# Patient Record
Sex: MALE | Race: BLACK OR AFRICAN AMERICAN | Employment: FULL TIME | URBAN - METROPOLITAN AREA
[De-identification: names, ages, dates, MRNs, and addresses within clinical notes are randomized per-mention and may not be internally consistent; named-entity substitution may affect disease eponyms.]

---

## 2017-09-25 ENCOUNTER — APPOINTMENT (OUTPATIENT)
Dept: MRI IMAGING | Age: 42
DRG: 488 | End: 2017-09-25
Attending: PHYSICIAN ASSISTANT
Payer: SELF-PAY

## 2017-09-25 ENCOUNTER — HOSPITAL ENCOUNTER (INPATIENT)
Age: 42
LOS: 4 days | Discharge: HOME HEALTH | DRG: 488 | End: 2017-09-29
Attending: EMERGENCY MEDICINE | Admitting: INTERNAL MEDICINE
Payer: SELF-PAY

## 2017-09-25 ENCOUNTER — APPOINTMENT (OUTPATIENT)
Dept: GENERAL RADIOLOGY | Age: 42
DRG: 488 | End: 2017-09-25
Attending: PHYSICIAN ASSISTANT
Payer: SELF-PAY

## 2017-09-25 DIAGNOSIS — R79.89 POSITIVE D DIMER: ICD-10-CM

## 2017-09-25 DIAGNOSIS — M00.9 PYOGENIC ARTHRITIS OF RIGHT KNEE JOINT, DUE TO UNSPECIFIED ORGANISM (HCC): Primary | ICD-10-CM

## 2017-09-25 DIAGNOSIS — N17.9 AKI (ACUTE KIDNEY INJURY) (HCC): ICD-10-CM

## 2017-09-25 LAB
ANION GAP SERPL CALC-SCNC: 9 MMOL/L (ref 3–18)
BASOPHILS # BLD: 0 K/UL (ref 0–0.1)
BASOPHILS NFR BLD: 0 % (ref 0–2)
BUN SERPL-MCNC: 18 MG/DL (ref 7–18)
BUN/CREAT SERPL: 12 (ref 12–20)
CALCIUM SERPL-MCNC: 8.9 MG/DL (ref 8.5–10.1)
CHLORIDE SERPL-SCNC: 104 MMOL/L (ref 100–108)
CO2 SERPL-SCNC: 27 MMOL/L (ref 21–32)
CREAT SERPL-MCNC: 1.51 MG/DL (ref 0.6–1.3)
D DIMER PPP FEU-MCNC: 0.49 UG/ML(FEU)
DIFFERENTIAL METHOD BLD: ABNORMAL
EOSINOPHIL # BLD: 0.1 K/UL (ref 0–0.4)
EOSINOPHIL NFR BLD: 1 % (ref 0–5)
ERYTHROCYTE [DISTWIDTH] IN BLOOD BY AUTOMATED COUNT: 13.6 % (ref 11.6–14.5)
GLUCOSE SERPL-MCNC: 99 MG/DL (ref 74–99)
HCT VFR BLD AUTO: 36.9 % (ref 36–48)
HGB BLD-MCNC: 12.3 G/DL (ref 13–16)
LACTATE BLD-SCNC: 0.9 MMOL/L (ref 0.4–2)
LYMPHOCYTES # BLD: 2.3 K/UL (ref 0.9–3.6)
LYMPHOCYTES NFR BLD: 18 % (ref 21–52)
MCH RBC QN AUTO: 31.1 PG (ref 24–34)
MCHC RBC AUTO-ENTMCNC: 33.3 G/DL (ref 31–37)
MCV RBC AUTO: 93.2 FL (ref 74–97)
MONOCYTES # BLD: 1.1 K/UL (ref 0.05–1.2)
MONOCYTES NFR BLD: 9 % (ref 3–10)
NEUTS SEG # BLD: 8.7 K/UL (ref 1.8–8)
NEUTS SEG NFR BLD: 72 % (ref 40–73)
PLATELET # BLD AUTO: 314 K/UL (ref 135–420)
PMV BLD AUTO: 10.4 FL (ref 9.2–11.8)
POTASSIUM SERPL-SCNC: 3.7 MMOL/L (ref 3.5–5.5)
RBC # BLD AUTO: 3.96 M/UL (ref 4.7–5.5)
SODIUM SERPL-SCNC: 140 MMOL/L (ref 136–145)
WBC # BLD AUTO: 12.3 K/UL (ref 4.6–13.2)

## 2017-09-25 PROCEDURE — 83605 ASSAY OF LACTIC ACID: CPT

## 2017-09-25 PROCEDURE — 73560 X-RAY EXAM OF KNEE 1 OR 2: CPT

## 2017-09-25 PROCEDURE — 65270000029 HC RM PRIVATE

## 2017-09-25 PROCEDURE — 74011000258 HC RX REV CODE- 258: Performed by: PHYSICIAN ASSISTANT

## 2017-09-25 PROCEDURE — 80048 BASIC METABOLIC PNL TOTAL CA: CPT | Performed by: PHYSICIAN ASSISTANT

## 2017-09-25 PROCEDURE — 85025 COMPLETE CBC W/AUTO DIFF WBC: CPT | Performed by: PHYSICIAN ASSISTANT

## 2017-09-25 PROCEDURE — 85610 PROTHROMBIN TIME: CPT | Performed by: PHYSICIAN ASSISTANT

## 2017-09-25 PROCEDURE — 85730 THROMBOPLASTIN TIME PARTIAL: CPT | Performed by: PHYSICIAN ASSISTANT

## 2017-09-25 PROCEDURE — 99284 EMERGENCY DEPT VISIT MOD MDM: CPT

## 2017-09-25 PROCEDURE — 74011250636 HC RX REV CODE- 250/636: Performed by: PHYSICIAN ASSISTANT

## 2017-09-25 PROCEDURE — 87040 BLOOD CULTURE FOR BACTERIA: CPT | Performed by: PHYSICIAN ASSISTANT

## 2017-09-25 PROCEDURE — 96365 THER/PROPH/DIAG IV INF INIT: CPT

## 2017-09-25 PROCEDURE — 73723 MRI JOINT LWR EXTR W/O&W/DYE: CPT

## 2017-09-25 PROCEDURE — 96375 TX/PRO/DX INJ NEW DRUG ADDON: CPT

## 2017-09-25 PROCEDURE — 85379 FIBRIN DEGRADATION QUANT: CPT | Performed by: PHYSICIAN ASSISTANT

## 2017-09-25 RX ORDER — MORPHINE SULFATE 4 MG/ML
4 INJECTION, SOLUTION INTRAMUSCULAR; INTRAVENOUS
Status: COMPLETED | OUTPATIENT
Start: 2017-09-25 | End: 2017-09-25

## 2017-09-25 RX ADMIN — Medication 4 MG: at 22:18

## 2017-09-25 RX ADMIN — SODIUM CHLORIDE 1000 ML: 900 INJECTION, SOLUTION INTRAVENOUS at 22:19

## 2017-09-25 RX ADMIN — CEFEPIME HYDROCHLORIDE 2 G: 2 INJECTION, POWDER, FOR SOLUTION INTRAVENOUS at 22:18

## 2017-09-25 NOTE — ED PROVIDER NOTES
HPI Comments: 7:43 PM  Moe Sotelo is a 43 y.o. male presents to ED c/o right knee pain onset 1 week ago. Pain is 9/10 in severity, exacerbated by palpation, and is progressively worsening. Pt states that he had a boil on the right knee which he popped with some purulent drainage 1 week ago. He notes that he rubbed it with alcohol, and last night, the right knee and lower right leg began to be stiff and achy which has progressively worsened. Pt has taken Tylenol and Percocet but without improvement. Pt denies recent surgery on right knee, and any other sxs or complaints. The history is provided by the patient. No  was used. No past medical history on file. No past surgical history on file. No family history on file. Social History     Social History    Marital status:      Spouse name: N/A    Number of children: N/A    Years of education: N/A     Occupational History    Not on file. Social History Main Topics    Smoking status: Not on file    Smokeless tobacco: Not on file    Alcohol use Not on file    Drug use: Not on file    Sexual activity: Not on file     Other Topics Concern    Not on file     Social History Narrative         ALLERGIES: Review of patient's allergies indicates no known allergies. Review of Systems   Constitutional: Negative for fever. HENT: Negative for facial swelling. Eyes: Negative for visual disturbance. Respiratory: Negative for shortness of breath. Cardiovascular: Negative for chest pain. Gastrointestinal: Negative for abdominal pain. Genitourinary: Negative for dysuria. Musculoskeletal: Positive for arthralgias (right knee), gait problem, joint swelling and myalgias (right lower leg). Negative for neck pain. Skin: Positive for color change and wound. Negative for rash. Neurological: Negative for dizziness. Psychiatric/Behavioral: Negative for confusion.    All other systems reviewed and are negative. Vitals:    09/25/17 1854 09/25/17 1856   BP:  (!) 187/97   Pulse:  89   Resp: 16 16   Temp:  98.2 °F (36.8 °C)   SpO2:  99%            Physical Exam   Constitutional: He appears well-developed and well-nourished. No distress. HENT:   Head: Normocephalic and atraumatic. Eyes: Conjunctivae are normal.   Neck: Normal range of motion. Neck supple. Cardiovascular: Normal rate. Pulmonary/Chest: Effort normal.   Abdominal: Soft. Musculoskeletal: Normal range of motion. ROM intact but limited due to significant pain. Tenderness is diffuse, but worse to the knee joint rather than surrounding the pustule. Neurological: He is alert. Skin: Skin is warm and dry. He is not diaphoretic. Diffuse erythema to entire right knee extending from distal thigh to proximal tibia and to lateral edges of knee. Small pustule to lateral side of knee with surrounding grey discoloration but no fluctuance or localized swelling. The entire knee and lower leg is edematous. No lymphangitic streaks. Small area of localized induration to left groin without erythema or fluctuance. Psychiatric: He has a normal mood and affect. Nursing note and vitals reviewed. MDM  Number of Diagnoses or Management Options  KARLEY (acute kidney injury) ([facility] [address]): new and requires workup  Positive D dimer: new and requires workup  Pyogenic arthritis of right knee joint, due to unspecified organism [facility]): new and requires workup  Diagnosis management comments: Concern for septic joint vs abscess. Afebrile, vitals stable. 2130: Discussed with attending Dr Gia Smith who has also evaluated the patient. No white count. Consulted Dr. Clint Gaming due to concern for septic joint. MRI pending. Dr. Clint Gaming would like the patient admitted to the hospitalist service and plans to take him to the OR for washout tomorrow. Consulted hospitalist, awaiting call back. Vanc and cefepime ordered. Blood cultures pending.   No signs of lymphangitis. 2312: Discussed with Dr. Barry Nieves she will admit patient. BP is elevated, plan to recheck after morphine. Cr slightly elevate, hydrated in ED. D-dimer ordered due to right lower leg edema and tenderness, d-dimer elevated, unable to give prophylactic lovenox due to surgery tomorrow; discussed with vascular jet    2345: Patient in MRI then going straight to vascular. Has room assignment. Imaging and dopplers pending. Amount and/or Complexity of Data Reviewed  Clinical lab tests: ordered and reviewed  Tests in the radiology section of CPT®: ordered and reviewed  Tests in the medicine section of CPT®: reviewed and ordered  Discuss the patient with other providers: yes    Risk of Complications, Morbidity, and/or Mortality  Presenting problems: high  Diagnostic procedures: high  Management options: high      ED Course       Procedures    Scribe Attestation      Phyllis Mercado acting as a scribe for and in the presence of [employer]. September 25, 2017 at 7:48 PM       Provider Attestation:      I personally performed the services described in the documentation, reviewed the documentation, as recorded by the scribe in my presence, and it accurately and completely records my words and actions. September 25, 2017 at 7:48 PM - [facility]. Diagnosis:   1. Pyogenic arthritis of right knee joint, due to unspecified organism ([facility] [address]    2. Positive D dimer    3. KARLEY (acute kidney injury) ([facility] [address]          Disposition: home    Follow-up Information     None          Patient's Medications   Start Taking    No medications on file   Continue Taking    NEOMYCIN-POLYMYXIN-HYDROCORTISONE (CORTISPORIN) OTIC SOLUTION    Administer 3-4 Drops in right ear four (4) times daily. For 10 days    OXYCODONE-ACETAMINOPHEN (PERCOCET) 5-325 MG PER TABLET    Take 1 Tab by mouth every eight (8) hours as needed for Pain.    These Medications have changed    No medications on file   Stop Taking    No medications on file     Mamadou MICHAEL Garcia PA-C

## 2017-09-25 NOTE — IP AVS SNAPSHOT
[address] Patient: Wander Rojas MRN: [id number] [id number] You are allergic to the following No active allergies Recent Documentation Height Weight BMI  
  
  
 1.651 m 77.1 kg 28.29 kg/m2 Unresulted Labs Order Current Status CULTURE, ANAEROBIC Preliminary result CULTURE, BLOOD Preliminary result CULTURE, BLOOD Preliminary result CULTURE, WOUND W GRAM STAIN Preliminary result Emergency Contacts Name Discharge Info Relation Home Work Mobile Pj Read DECLINED CAREGIVER [4] Other Relative [6] [phone] About your hospitalization You were admitted on:  September 25, 2017 You last received care in the:  [facility] [address] 1980 You were discharged on:  September 29, 2017 Unit phone number:  [phone] Why you were hospitalized Your primary diagnosis was:  Septic Arthritis Of Knee, Right (Hcc) Your diagnoses also included:  Htn (Hypertension), Arf (Acute Renal Failure) (Hcc), Cellulitis Of Right Knee Providers Seen During Your Hospitalizations Provider Role Specialty Primary office phone Yelena Antonio,  Attending Provider Emergency Medicine [phone] Juan Diallo MD Attending Provider Emergency Medicine [phone] Krista Jaeger MD Attending Provider Internal Medicine [phone] Crispin Paiz MD Attending Provider Internal Medicine [phone] Your Primary Care Physician (PCP) Primary Care Physician Office Phone Office Fax NONE ** None ** ** None ** Follow-up Information Follow up With Details Comments Contact Info Lucila Birmingham MD On 10/13/2017 Appointment at 3:00pm [address] [place] [id number] 
[phone]  Sara Pike MD On 10/3/2017 Appointment at 4:30pm with SHANTI Forte [id number] [place] [address] 
 [facility] [place] [address] 
[phone] Your Appointments Tuesday October 03, 2017  4:30 PM EDT  
POST OP with Neeraj Chand PA-C  
[facility] ([address]) [address] [facility], [address] [place] [address]  
[phone] Friday October 13, 2017  3:00 PM EDT HOSPITAL FOLLOW-UP with Vinayak Dixon MD  
[facility] Roderick Valenzuela [facility] [address] [phone] Current Discharge Medication List  
  
START taking these medications Dose & Instructions Dispensing Information Comments Morning Noon Evening Bedtime  
 amLODIPine 10 mg tablet Commonly known as:  Izetta Pantera Your last dose was: Your next dose is:    
   
   
 Dose:  10 mg Take 1 Tab by mouth daily. Quantity:  30 Tab Refills:  0  
     
   
   
   
  
 cloNIDine HCl 0.1 mg tablet Commonly known as:  CATAPRES Your last dose was: Your next dose is:    
   
   
 Dose:  0.1 mg Take 1 Tab by mouth two (2) times a day. Quantity:  60 Tab Refills:  0  
     
   
   
   
  
 docusate sodium 100 mg capsule Commonly known as:  Darshan Hernandez Your last dose was: Your next dose is:    
   
   
 Dose:  100 mg Take 1 Cap by mouth two (2) times daily as needed for Constipation for up to 90 days. Quantity:  60 Cap Refills:  0  
     
   
   
   
  
 linezolid 600 mg tablet Commonly known as:  Geroge Delatorre Your last dose was: Your next dose is:    
   
   
 Dose:  600 mg Take 1 Tab by mouth every twelve (12) hours for 40 days. Indications: STAPHYLOCOCCUS AUREUS SKIN AND SKIN STRUCTURE INFECTION Quantity:  80 Tab Refills:  0 CONTINUE these medications which have CHANGED Dose & Instructions Dispensing Information Comments Morning Noon Evening Bedtime oxyCODONE-acetaminophen 5-325 mg per tablet Commonly known as:  PERCOCET What changed:   
- when to take this 
- reasons to take this Your last dose was: Your next dose is:    
   
   
 Dose:  1 Tab Take 1 Tab by mouth every four (4) hours as needed. Max Daily Amount: 6 Tabs. Quantity:  20 Tab Refills:  0 CONTINUE these medications which have NOT CHANGED Dose & Instructions Dispensing Information Comments Morning Noon Evening Bedtime  
 neomycin-polymyxin-hydrocortisone otic solution Commonly known as:  CORTISPORIN Your last dose was: Your next dose is:    
   
   
 Dose:  3-4 Drop Administer 3-4 Drops in right ear four (4) times daily. For 10 days Quantity:  10 mL Refills:  0 Where to Get Your Medications Information on where to get these meds will be given to you by the nurse or doctor. ! Ask your nurse or doctor about these medications  
  amLODIPine 10 mg tablet  
 cloNIDine HCl 0.1 mg tablet  
 docusate sodium 100 mg capsule  
 linezolid 600 mg tablet  
 oxyCODONE-acetaminophen 5-325 mg per tablet Discharge Instructions Open Drainage of a Joint: What to Expect at Home Your Recovery You had surgery to clean out an infection in one or more of your joints. The doctor removed fluid and material from the joint. You may feel sore and have some swelling at the surgical site. Your doctor will give you specific instructions on when you can do your normal activities again, such as driving and going back to work. This care sheet gives you a general idea about how long it will take for you to recover. But each person recovers at a different pace. Follow the steps below to get better as quickly as possible. How can you care for yourself at home? Activity · Rest when you feel tired. · You may be able to take showers, if your doctor says it is okay. anytime you sit or lie down during the next 3 days. Try to keep it above the level of your heart. This will help reduce swelling. Other instructions · You may need to use crutches after surgery if the joint is used for walking. Use crutches for as long as your doctor tells you to. Your doctor will tell you when you can put weight on the joint. It may help to use a backpack or wear clothes with a lot of pockets to carry items. · You may wear a sling or brace if you had surgery on the upper part of your body. The doctor will tell you how long you need to wear a support device. Follow-up care is a key part of your treatment and safety. Be sure to make and go to all appointments, and call your doctor if you are having problems. It's also a good idea to know your test results and keep a list of the medicines you take. When should you call for help? Call 911 anytime you think you may need emergency care. For example, call if: 
· You passed out (lost consciousness). · You have severe trouble breathing. · You have sudden chest pain and shortness of breath, or you cough up blood. Call your doctor now or seek immediate medical care if: 
· You have pain that does not get better after you take pain medicine. · You have loose stitches, or your incision comes open. · You have a drain, and it comes out. · You are bleeding through your dressing. A small amount of blood is normal. 
· You have symptoms of a blood clot in your arm or leg (called a deep vein thrombosis). These may include: 
¨ Pain in the arm, calf, back of the knee, thigh, or groin. ¨ Redness and swelling in the arm, leg, or groin. · You have signs of infection, such as: 
¨ Increased pain, swelling, warmth, or redness. ¨ Red streaks leading from the incision. ¨ Pus draining from the incision. ¨ A fever. Watch closely for changes in your health, and be sure to contact your doctor if: 
· You do not get better as expected. Where can you learn more? Go to http://yaya-soto.info/. Enter [id number] in the search box to learn more about \"Open Drainage of a Joint: What to Expect at Home. \" Current as of: March 20, 2017 Content Version: 11.3 © 4646-1102 Skyscanner. Care instructions adapted under license by [employer] (which disclaims liability or warranty for this information). If you have questions about a medical condition or this instruction, always ask your healthcare professional. [place] [address] any warranty or liability for your use of this information. Cellulitis: Care Instructions Your Care Instructions Cellulitis is a skin infection. It often occurs after a break in the skin from a scrape, cut, bite, or puncture, or after a rash. The doctor has checked you carefully, but problems can develop later. If you notice any problems or new symptoms, get medical treatment right away. Follow-up care is a key part of your treatment and safety. Be sure to make and go to all appointments, and call your doctor if you are having problems. It's also a good idea to know your test results and keep a list of the medicines you take. How can you care for yourself at home? · Take your antibiotics as directed. Do not stop taking them just because you feel better. You need to take the full course of antibiotics. · Prop up the infected area on pillows to reduce pain and swelling. Try to keep the area above the level of your heart as often as you can. · If your doctor told you how to care for your wound, follow your doctor's instructions. If you did not get instructions, follow this general advice: ¨ Wash the wound with clean water 2 times a day. Don't use hydrogen peroxide or alcohol, which can slow healing. ¨ You may cover the wound with a thin layer of petroleum jelly, such as Vaseline, and a nonstick bandage. ¨ Apply more petroleum jelly and replace the bandage as needed. · Be safe with medicines. Take pain medicines exactly as directed. ¨ If the doctor gave you a prescription medicine for pain, take it as prescribed. ¨ If you are not taking a prescription pain medicine, ask your doctor if you can take an over-the-counter medicine. To prevent cellulitis in the future · Try to prevent cuts, scrapes, or other injuries to your skin. Cellulitis most often occurs where there is a break in the skin. · If you get a scrape, cut, mild burn, or bite, wash the wound with clean water as soon as you can to help avoid infection. Don't use hydrogen peroxide or alcohol, which can slow healing. · If you have swelling in your legs (edema), support stockings and good skin care may help prevent leg sores and cellulitis. · Take care of your feet, especially if you have diabetes or other conditions that increase the risk of infection. Wear shoes and socks. Do not go barefoot. If you have athlete's foot or other skin problems on your feet, talk to your doctor about how to treat them. When should you call for help? Call your doctor now or seek immediate medical care if: 
· You have signs that your infection is getting worse, such as: 
¨ Increased pain, swelling, warmth, or redness. ¨ Red streaks leading from the area. ¨ Pus draining from the area. ¨ A fever. · You get a rash. Watch closely for changes in your health, and be sure to contact your doctor if: 
· You are not getting better after 1 day (24 hours). · You do not get better as expected. Where can you learn more? Go to http://yaya-soto.info/. Cj De Jesus in the search box to learn more about \"Cellulitis: Care Instructions. \" Current as of: October 13, 2016 Content Version: 11.3 © 9224-3366 Universal Ad. Care instructions adapted under license by [employer] (which disclaims liability or warranty for this information).  If you have questions about a medical condition or this instruction, always ask your healthcare professional. [address] any warranty or liability for your use of this information. High Blood Pressure: Care Instructions Your Care Instructions If your blood pressure is usually above 140/90, you have high blood pressure, or hypertension. That means the top number is 140 or higher or the bottom number is 90 or higher, or both. Despite what a lot of people think, high blood pressure usually doesn't cause headaches or make you feel dizzy or lightheaded. It usually has no symptoms. But it does increase your risk for heart attack, stroke, and kidney or eye damage. The higher your blood pressure, the more your risk increases. Your doctor will give you a goal for your blood pressure. Your goal will be based on your health and your age. An example of a goal is to keep your blood pressure below 140/90. Lifestyle changes, such as eating healthy and being active, are always important to help lower blood pressure. You might also take medicine to reach your blood pressure goal. 
Follow-up care is a key part of your treatment and safety. Be sure to make and go to all appointments, and call your doctor if you are having problems. It's also a good idea to know your test results and keep a list of the medicines you take. How can you care for yourself at home? Medical treatment · If you stop taking your medicine, your blood pressure will go back up. You may take one or more types of medicine to lower your blood pressure. Be safe with medicines. Take your medicine exactly as prescribed. Call your doctor if you think you are having a problem with your medicine. · Talk to your doctor before you start taking aspirin every day. Aspirin can help certain people lower their risk of a heart attack or stroke. But taking aspirin isn't right for everyone, because it can cause serious bleeding. · See your doctor regularly. You may need to see the doctor more often at first or until your blood pressure comes down. · If you are taking blood pressure medicine, talk to your doctor before you take decongestants or anti-inflammatory medicine, such as ibuprofen. Some of these medicines can raise blood pressure. · Learn how to check your blood pressure at home. Lifestyle changes · Stay at a healthy weight. This is especially important if you put on weight around the waist. Losing even 10 pounds can help you lower your blood pressure. · If your doctor recommends it, get more exercise. Walking is a good choice. Bit by bit, increase the amount you walk every day. Try for at least 30 minutes on most days of the week. You also may want to swim, bike, or do other activities. · Avoid or limit alcohol. Talk to your doctor about whether you can drink any alcohol. · Try to limit how much sodium you eat to less than 2,300 milligrams (mg) a day. Your doctor may ask you to try to eat less than 1,500 mg a day. · Eat plenty of fruits (such as bananas and oranges), vegetables, legumes, whole grains, and low-fat dairy products. · Lower the amount of saturated fat in your diet. Saturated fat is found in animal products such as milk, cheese, and meat. Limiting these foods may help you lose weight and also lower your risk for heart disease. · Do not smoke. Smoking increases your risk for heart attack and stroke. If you need help quitting, talk to your doctor about stop-smoking programs and medicines. These can increase your chances of quitting for good. When should you call for help? Call 911 anytime you think you may need emergency care. This may mean having symptoms that suggest that your blood pressure is causing a serious heart or blood vessel problem. Your blood pressure may be over 180/110. For example, call 911 if: 
· You have symptoms of a heart attack. These may include: ¨ Chest pain or pressure, or a strange feeling in the chest. 
¨ Sweating. ¨ Shortness of breath. ¨ Nausea or vomiting. ¨ Pain, pressure, or a strange feeling in the back, neck, jaw, or upper belly or in one or both shoulders or arms. ¨ Lightheadedness or sudden weakness. ¨ A fast or irregular heartbeat. · You have symptoms of a stroke. These may include: 
¨ Sudden numbness, tingling, weakness, or loss of movement in your face, arm, or leg, especially on only one side of your body. ¨ Sudden vision changes. ¨ Sudden trouble speaking. ¨ Sudden confusion or trouble understanding simple statements. ¨ Sudden problems with walking or balance. ¨ A sudden, severe headache that is different from past headaches. · You have severe back or belly pain. Do not wait until your blood pressure comes down on its own. Get help right away. Call your doctor now or seek immediate care if: 
· Your blood pressure is much higher than normal (such as 180/110 or higher), but you don't have symptoms. · You think high blood pressure is causing symptoms, such as: ¨ Severe headache. ¨ Blurry vision. Watch closely for changes in your health, and be sure to contact your doctor if: 
· Your blood pressure measures 140/90 or higher at least 2 times. That means the top number is 140 or higher or the bottom number is 90 or higher, or both. · You think you may be having side effects from your blood pressure medicine. · Your blood pressure is usually normal, but it goes above normal at least 2 times. Where can you learn more? Go to http://yaya-soto.info/. Enter H988 in the search box to learn more about \"High Blood Pressure: Care Instructions. \" Current as of: August 8, 2016 Content Version: 11.3 © 4160-0583 Qool. Care instructions adapted under license by [employer] (which disclaims liability or warranty for this information).  If you have questions about a medical condition or this instruction, always ask your healthcare professional. [place] [address] any warranty or liability for your use of this information. Acute Kidney Injury: Care Instructions Your Care Instructions Acute kidney injury is the sudden loss of kidney function that happens when the kidneys stop working over a period of hours, days or, in some cases, weeks. It is also known as acute renal failure. Common causes of acute kidney injury are dehydration, blood loss, and medicines. When acute kidney injury happens, the kidneys cannot remove waste and excess fluids from the body. The waste and fluids build up and become harmful. Kidney function may return to normal if the cause of acute kidney injury is treated quickly. Your chance of a full recovery depends on what caused the problem, how quickly the cause was treated, and what other medical problems you have. A machine may be used to help your kidneys remove waste and fluids for a short period of time. This is called dialysis. Follow-up care is a key part of your treatment and safety. Be sure to make and go to all appointments, and call your doctor if you are having problems. It's also a good idea to know your test results and keep a list of the medicines you take. How can you care for yourself at home? · Talk to your doctor about how much fluid you should drink. · Eat a balanced diet. Talk to your doctor or a dietitian about what type of diet may be best for you. · Follow the instructions and schedule for dialysis that your doctor gives you. · Do not smoke. Smoking can make your condition worse. If you need help quitting, talk to your doctor about stop-smoking programs and medicines. These can increase your chances of quitting for good. · Do not drink alcohol. · Review all of your medicines with your doctor.  Do not take any medicines, including nonsteroidal anti-inflammatory drugs (NSAIDs) such as ibuprofen (Advil, Motrin) or naproxen (Aleve), unless your doctor says it is safe for you to do so. · Make sure that anyone treating you for any health problem knows that you have had acute kidney injury. When should you call for help? Call 911 anytime you think you may need emergency care. For example, call if: 
· You passed out (lost consciousness). · You have severe trouble breathing. Call your doctor now or seek immediate medical care if: 
· You have less urine than normal or no urine. · You have trouble urinating or can urinate only very small amounts. · You are confused or have trouble thinking clearly. · You feel weaker or more tired than usual. 
· You are very thirsty, lightheaded, or dizzy. · You have nausea and vomiting. · You have new swelling of your arms or feet, or your swelling is worse. · You have blood in your urine. · Your body weight goes up every day. · You have new or worse trouble breathing. Watch closely for changes in your health, and be sure to contact your doctor if: 
· You do not get better as expected. Where can you learn more? Go to http://yaya-soto.info/. Enter R225 in the search box to learn more about \"Acute Kidney Injury: Care Instructions. \" Current as of: April 3, 2017 Content Version: 11.3 © 7482-9612 CoverMe. Care instructions adapted under license by [employer] (which disclaims liability or warranty for this information). If you have questions about a medical condition or this instruction, always ask your healthcare professional. [place] [address] any warranty or liability for your use of this information. Septic Arthritis: Care Instructions Your Care Instructions Septic arthritis is a bacterial infection in a joint. This occurs when an infection from another part of the body, such as pneumonia or a skin or kidney infection, travels through the bloodstream to the joint.  It may also spread to the joint from an infection in nearby soft tissue, or it can follow a surgery or injury. The joint is often warm, swollen, and tender. Early treatment can prevent permanent damage to the joint. Treatment includes antibiotics and draining the joint to remove the infection. Depending on which part of your body is infected, your doctor may drain the joint with a needle or you may need surgery to drain the joint. Follow-up care is a key part of your treatment and safety. Be sure to make and go to all appointments, and call your doctor if you are having problems. It's also a good idea to know your test results and keep a list of the medicines you take. How can you care for yourself at home? · You will receive antibiotics through a vein (IV) at first. After this, you may take antibiotics by mouth. · Take your antibiotics as directed. Do not stop taking them just because you feel better. You need to take the full course of antibiotics. · Rest the joint as much as you can. · If possible, prop up the injured joint on pillows as much as possible for the next 3 days. Try to keep it at or above the level of your heart. This can help reduce pain and swelling. · Follow your doctor's instructions on exercises for the affected joint. · Do not smoke. Smoking can make it harder for your body to fight the infection. If you need help quitting, talk to your doctor about stop-smoking programs and medicines. These can increase your chances of quitting for good. When should you call for help? Call your doctor now or seek immediate medical care if: · Signs of infection return or get worse. These include: 
¨ Increased pain, swelling, warmth, or redness. ¨ Red streaks leading from the joint. ¨ Pus draining from the site. ¨ A fever. ¨ Shaking chills. · You have trouble walking or using the joint. Watch closely for changes in your health, and be sure to contact your doctor if: · After you finish treatment, you cannot move a joint as well as you could before the infection. · The affected limb seems shorter than it was before the infection. · You do not get better as expected. Where can you learn more? Go to http://yaya-soto.info/. Enter C265 in the search box to learn more about \"Septic Arthritis: Care Instructions. \" Current as of: March 3, 2017 Content Version: 11.3 © 2550-7354 Niche. Care instructions adapted under license by [employer]ep (which disclaims liability or warranty for this information). If you have questions about a medical condition or this instruction, always ask your healthcare professional. [address] any warranty or liability for your use of this information. Patient armband removed and shredded MyChart Activation Thank you for requesting access to [employer]. Please follow the instructions below to securely access and download your online medical record. [employer] allows you to send messages to your doctor, view your test results, renew your prescriptions, schedule appointments, and more. How Do I Sign Up? 1. In your internet browser, go to [email or web address] 
2. Click on the First Time User? Click Here link in the Sign In box. You will be redirect to the New Member Sign Up page. 3. Enter your [employer] Access Code exactly as it appears below. You will not need to use this code after youve completed the sign-up process. If you do not sign up before the expiration date, you must request a new code. [employer] Access Code: [email or web address] Expires: 2017  1:44 PM (This is the date your [employer] access code will ) 4. Enter the last four digits of your Social Security Number (xxxx) and Date of Birth (mm/dd/yyyy) as indicated and click Submit. You will be taken to the next sign-up page. 5. Create a [employer] ID.  This will be your [employer] login ID and cannot be changed, so think of one that is secure and easy to remember. 6. Create a [employer] password. You can change your password at any time. 7. Enter your Password Reset Question and Answer. This can be used at a later time if you forget your password. 8. Enter your e-mail address. You will receive e-mail notification when new information is available in [address] 9. Click Sign Up. You can now view and download portions of your medical record. 10. Click the Download Summary menu link to download a portable copy of your medical information. Additional Information If you have questions, please visit the Frequently Asked Questions section of the [employer] website at [email or web address] Remember, [employer] is NOT to be used for urgent needs. For medical emergencies, dial 911. DISCHARGE SUMMARY from Nurse The following personal items are in your possession at time of discharge: 
 
Dental Appliances: None Visual Aid: None PATIENT INSTRUCTIONS: 
 
 
F-face looks uneven A-arms unable to move or move unevenly S-speech slurred or non-existent T-time-call 911 as soon as signs and symptoms begin-DO NOT go Back to bed or wait to see if you get better-TIME IS BRAIN. Warning Signs of HEART ATTACK Call 911 if you have these symptoms: 
? Chest discomfort. Most heart attacks involve discomfort in the center of the chest that lasts more than a few minutes, or that goes away and comes back. It can feel like uncomfortable pressure, squeezing, fullness, or pain. ? Discomfort in other areas of the upper body. Symptoms can include pain or discomfort in one or both arms, the back, neck, jaw, or stomach. ? Shortness of breath with or without chest discomfort. ? Other signs may include breaking out in a cold sweat, nausea, or lightheadedness. Don't wait more than five minutes to call [address]! Fast action can save your life. Calling 911 is almost always the fastest way to get lifesaving treatment. Emergency Medical Services staff can begin treatment when they arrive  up to an hour sooner than if someone gets to the hospital by car. The discharge information has been reviewed with the patient. The patient verbalized understanding. Discharge medications reviewed with the patient and appropriate educational materials and side effects teaching were provided. Patient armband removed and shredded Discharge Instructions Attachments/References AMLODIPINE (BY MOUTH) (ENGLISH) CLONIDINE (BY MOUTH) (ENGLISH) LAXATIVE, STOOL SOFTENERS (BY MOUTH) (ENGLISH) LINEZOLID (BY MOUTH) (ENGLISH) Discharge Orders None [facility] Announcement We are excited to announce that we are making your provider's discharge notes available to you in [employer]. You will see these notes when they are completed and signed by the physician that discharged you from your recent hospital stay. If you have any questions or concerns about any information you see in [employer], please call the Health Information Department where you were seen or reach out to your Primary Care Provider for more information about your plan of care. Introducing [facility]! [employer] introduces [employer] patient portal. Now you can access parts of your medical record, email your doctor's office, and request medication refills online. 1. In your internet browser, go to [email or web address] 2. Click on the First Time User? Click Here link in the Sign In box. You will see the New Member Sign Up page. 3. Enter your [employer] Access Code exactly as it appears below. You will not need to use this code after youve completed the sign-up process. If you do not sign up before the expiration date, you must request a new code. · [employer] Access Code: [email or web address] Expires: 12/27/2017  1:44 PM 
 
4. Enter the last four digits of your Social Security Number (xxxx) and Date of Birth (mm/dd/yyyy) as indicated and click Submit. You will be taken to the next sign-up page. 5. Create a [employer] ID. This will be your [employer] login ID and cannot be changed, so think of one that is secure and easy to remember. 6. Create a [employer] password. You can change your password at any time. 7. Enter your Password Reset Question and Answer. This can be used at a later time if you forget your password. 8. Enter your e-mail address. You will receive e-mail notification when new information is available in [address] 9. Click Sign Up. You can now view and download portions of your medical record. 10. Click the Download Summary menu link to download a portable copy of your medical information. If you have questions, please visit the Frequently Asked Questions section of the [employer] website. Remember, [employer] is NOT to be used for urgent needs. For medical emergencies, dial 911. Now available from your iPhone and Android! General Information Please provide this summary of care documentation to your next provider. Patient Signature:  ____________________________________________________________ Date:  ____________________________________________________________  
  
Jose Davila Provider Signature:  ____________________________________________________________ Date:  ____________________________________________________________ More Information Amlodipine (By mouth) Amlodipine (ou-OUH-pt-peen) Treats high blood pressure and angina (chest pain). This medicine is a calcium channel blocker. Brand Name(s): Norvasc There may be other brand names for this medicine. When This Medicine Should Not Be Used: This medicine is not right for everyone. Do not use it if you had an allergic reaction to amlodipine. How to Use This Medicine:  
Tablet, Dissolving Tablet · Take your medicine as directed. Your dose may need to be changed several times to find what works best for you. Take this medicine at the same time each day. · Read and follow the patient instructions that come with this medicine. Talk to your doctor or pharmacist if you have any questions. · Missed dose: Take a dose as soon as you remember. If it has been more than 12 hours since you were supposed to take your dose, skip the missed dose and take your next regular dose at the regular time. · Store the medicine in a closed container at room temperature, away from heat, moisture, and direct light. Drugs and Foods to Avoid: Ask your doctor or pharmacist before using any other medicine, including over-the-counter medicines, vitamins, and herbal products. · Some medicines can affect how amlodipine works. Tell your doctor if you are also using any of the following: ¨ Clarithromycin, cyclosporine, diltiazem, itraconazole, ritonavir, sildenafil, simvastatin, tacrolimus Warnings While Using This Medicine: · Tell your doctor if you are pregnant or breastfeeding, or if you have liver disease, heart disease, coronary artery disease, or aortic stenosis. · This medicine could lower your blood pressure too much, especially when you first use it or if you are dehydrated. Stand or sit up slowly if you feel lightheaded or dizzy. · Your doctor will check your progress and the effects of this medicine at regular visits. Keep all appointments. · Do not stop using this medicine without asking your doctor, even if you feel well. This medicine will not cure high blood pressure, but it will help keep it in normal range. You may have to take blood pressure medicine for the rest of your life. · Keep all medicine out of the reach of children. Never share your medicine with anyone. Possible Side Effects While Using This Medicine:  
Call your doctor right away if you notice any of these side effects: · Allergic reaction: Itching or hives, swelling in your face or hands, swelling or tingling in your mouth or throat, chest tightness, trouble breathing · Lightheadedness, dizziness · New or worsening chest pain · Swelling in your hands, ankles, or legs · Trouble breathing, nausea, unusual sweating, fainting If you notice other side effects that you think are caused by this medicine, tell your doctor. Call your doctor for medical advice about side effects. You may report side effects to FDA at 1-822-FDA-4282 © 2017 [facility] Information is for End User's use only and may not be sold, redistributed or otherwise used for commercial purposes. The above information is an  only. It is not intended as medical advice for individual conditions or treatments. Talk to your doctor, nurse or pharmacist before following any medical regimen to see if it is safe and effective for you. Clonidine (By mouth) Clonidine (Darion Lema) Treats high blood pressure. Also treats ADHD. Brand Name(s): Madayaprkevin, Tommy Fish There may be other brand names for this medicine. When This Medicine Should Not Be Used: This medicine is not right for everyone. Do not use it if you had an allergic reaction to clonidine. How to Use This Medicine:  
Long Acting Suspension, Tablet, Long Acting Tablet · Take your medicine as directed. Your dose may need to be changed several times to find what works best for you. · Swallow the extended-release tablet whole. Do not crush, break, or chew it. · Clonidine extended-release tablets work differently than clonidine immediate-release tablets. Do not switch from the extended-release tablets to the immediate-release tablets unless your doctor tells you to. · Measure the oral liquid medicine with a marked measuring spoon, oral syringe, or medicine cup. Shake the bottle well before each use. · Read and follow the patient instructions that come with this medicine. Talk to your doctor or pharmacist if you have any questions. · Missed dose: Take a dose as soon as you remember. If it is almost time for your next dose, wait until then and take a regular dose. Do not take extra medicine to make up for a missed dose. If you miss more than 1 dose of clonidine tablets, check with your doctor right away. · Store the medicine in a closed container at room temperature, away from heat, moisture, and direct light. Drugs and Foods to Avoid: Ask your doctor or pharmacist before using any other medicine, including over-the-counter medicines, vitamins, and herbal products. · Do not use this medicine together with other products that contain clonidine. · Some medicines can affect how clonidine works. Tell your doctor if you are taking depression medicine. · Tell your doctor if you use anything else that makes you sleepy. Some examples are allergy medicine, narcotic pain medicine, and alcohol. Warnings While Using This Medicine: · Tell your doctor if you are pregnant, breastfeeding, or have kidney disease, heart or blood vessel disease, heart rhythm problems, stomach or bowel problems, or a history of heart attack or stroke. · This medicine may make you drowsy, dizzy, or lightheaded. Do not drive or do anything that could be dangerous until you know how this medicine affects you. · This medicine may cause dryness of the eyes. Talk to your doctor about how to treat the dryness. · Do not stop using this medicine suddenly. Your doctor will need to slowly decrease your dose before you stop it completely. · Tell any doctor or dentist who treats you that you are using this medicine. You may need to stop using this medicine several days before you have surgery or medical tests. · Even if you feel well, do not stop using the medicine without asking your doctor first. This medicine will not cure your high blood pressure, but it will help keep it in the normal range. You may have to take blood pressure medicine for the rest of your life. · Your doctor will check your progress and the effects of this medicine at regular visits. Keep all appointments. · Keep all medicine out of the reach of children. Never share your medicine with anyone. Possible Side Effects While Using This Medicine:  
Call your doctor right away if you notice any of these side effects: · Allergic reaction: Itching or hives, swelling in your face or hands, swelling or tingling in your mouth or throat, chest tightness, trouble breathing · Fast, slow, pounding, or uneven heartbeat · Lightheadedness, dizziness, fainting · Swelling in your hands, ankles, or feet · Unusual tiredness or weakness If you notice these less serious side effects, talk with your doctor: · Constipation, stomach pain · Dry eyes, mouth, or throat · Mild skin rash · New or worsening headache If you notice other side effects that you think are caused by this medicine, tell your doctor. Call your doctor for medical advice about side effects. You may report side effects to FDA at 0-607-FDA-3225 © 2017 [address] Information is for End User's use only and may not be sold, redistributed or otherwise used for commercial purposes. The above information is an  only. It is not intended as medical advice for individual conditions or treatments.  Talk to your doctor, nurse or pharmacist before following any medical regimen to see if it is safe and effective for you. Laxative, Stool Softeners (By mouth) Treats constipation by helping you have a bowel movement. Brand Name(s): Col-Rite, Colace, Colace Clear, DSS, Diocto, Diocto Liquid, Doc-Q-Lace, Docu Liquid, Docuprene, Docusil, Dok, Dulcolax, Fleet Sof-Lax, [facility] Docusate Calcium, [facility] Stool Softener There may be other brand names for this medicine. When This Medicine Should Not Be Used: You should not use this medicine if you have severe stomach pain, nausea, or vomiting. Stool softeners should not be used if you have severe stomach pain and do not know the cause. How to Use This Medicine:  
Capsule, Tablet, Liquid, Liquid Filled Capsule · Your doctor will tell you how much medicine to use. Do not use more than directed. · Follow the instructions on the medicine label if you are using this medicine without a prescription. · Drink 6 to 8 glasses of water daily while using any laxative. · To make the oral liquid taste better, you may mix it with one-half glass of milk or fruit juice. · Measure the oral liquid medicine with a marked measuring spoon, oral syringe, medicine cup, or medicine dropper. If a dose is missed: · Use the missed dose as soon as possible. · If you do not remember the missed dose until the next day, skip the missed dose and go back to your regular dosing schedule. · You should not use two doses at the same time. How to Store and Dispose of This Medicine: · Store the medicine in a tightly closed container at room temperature, away from heat and moisture. Do not store liquid-filled capsules in the refrigerator. · Keep all medicine out of the reach of children. Drugs and Foods to Avoid: Ask your doctor or pharmacist before using any other medicine, including over-the-counter medicines, vitamins, and herbal products. · You should not use mineral oil while you are using a stool softener. · You should not use a stool softener within 2 hours before or after taking any other medicines. Laxatives can keep other medicines from working correctly. Warnings While Using This Medicine: · If you are pregnant or breastfeeding, talk to your doctor before taking this medicine. · Do not give laxatives to children under 10years old unless you talk to your doctor. · You should not use this laxative for longer than 1 week unless approved by your doctor. Laxatives may be habit-forming and can harm your bowels if you use them too long. · Stool softeners usually work in 1 to 2 days, but for some people, results can take as long as 3 to 5 days. Possible Side Effects While Using This Medicine: If you notice these less serious side effects, talk with your doctor: · Nausea · Sore throat · Skin rash If you notice other side effects that you think are caused by this medicine, tell your doctor. Call your doctor for medical advice about side effects. You may report side effects to FDA at 3-591-FDA-9220 © 2017 [facility] Information is for End User's use only and may not be sold, redistributed or otherwise used for commercial purposes. The above information is an  only. It is not intended as medical advice for individual conditions or treatments. Talk to your doctor, nurse or pharmacist before following any medical regimen to see if it is safe and effective for you. Linezolid (By mouth) Linezolid (mih-UGV-pd-lid) Treats bacterial infections, including skin infections and pneumonia. Brand Name(s): Zyvox There may be other brand names for this medicine. When This Medicine Should Not Be Used: This medicine is not right for everyone. Do not use it if you had an allergic reaction to linezolid. How to Use This Medicine:  
Liquid, Tablet · Your doctor will tell you how much medicine to use. Do not use more than directed. · Oral liquid:  Gently turn the bottle upside down 3 to 5 times before you pour your dose. Do not shake the bottle. Measure the oral liquid medicine with a marked measuring spoon, oral syringe, or medicine cup. · Take all of the medicine in your prescription to clear up your infection, even if you feel better after the first few doses. · Missed dose: Take a dose as soon as you remember. If it is almost time for your next dose, wait until then and take a regular dose. Do not take extra medicine to make up for a missed dose. · Store the medicine in a closed container at room temperature, away from heat, moisture, and direct light. You may keep the oral liquid for up to 21 days. After that, throw away any unused medicine. Drugs and Foods to Avoid: Ask your doctor or pharmacist before using any other medicine, including over-the-counter medicines, vitamins, and herbal products. · You must avoid many other medicines while you are using linezolid. These medicines used together could cause serious health problems, including death. Ask your doctor before you use any other medicine. You may need to wait 1 to 5 weeks before you can use the other medicine. · Do not use linezolid if you have used an MAOI in the past 14 days. · Some other medicines that may interact with linezolid include buspirone, bupropion, dobutamine, dopamine, meperidine, cold or allergy medicine (such as phenylpropanolamine, pseudoephedrine), medicine to treat depression (SSRI or TCA), or medicine to treat migraine headaches. This is not a complete list. 
· Avoid foods and drinks that are high in tyramine, because your blood pressure could get dangerously high.  Your doctor should give you a complete list. In general, do not eat anything aged or fermented, such as most cheese, most alcohol, cured meat (such as salami), sauerkraut, and soy sauce. Check the expiration dates on packages. Tyramine levels get higher as food gets older or if it has not been refrigerated properly. Warnings While Using This Medicine: · Tell your doctor if you are pregnant or breastfeeding, or if you have bone marrow problems, carcinoid syndrome, diabetes, pheochromocytoma (a tumor of the adrenal gland), thyroid problems, high blood pressure, or a history of seizures. · This medicine may cause the following problems: 
¨ Lactic acidosis (too much lactic acid in the blood) ¨ Serotonin syndrome ¨ Nerve problems, especially in the eyes, hands, arms, legs, or feet · This medicine may make you bleed, bruise, or get infections more easily. Take precautions to prevent illness and injury. Wash your hands often. · This medicine can cause diarrhea. Call your doctor if the diarrhea becomes severe, does not stop, or is bloody. Do not take any medicine to stop diarrhea until you have talked to your doctor. Diarrhea can occur 2 months or more after you stop taking this medicine. · The oral liquid contains phenylalanine (aspartame). This is only a concern if you have phenylketonuria. · Your doctor will do lab tests at regular visits to check on the effects of this medicine. Keep all appointments. · Call your doctor if your symptoms do not improve or if they get worse. · Keep all medicine out of the reach of children. Never share your medicine with anyone. Possible Side Effects While Using This Medicine:  
Call your doctor right away if you notice any of these side effects: · Allergic reaction: Itching or hives, swelling in your face or hands, swelling or tingling in your mouth or throat, chest tightness, trouble breathing · Anxiety, restlessness, fast heartbeat, fever, sweating, muscle spasms, twitching, nausea, vomiting, diarrhea, seeing or hearing things that are not there · Diarrhea that contains blood · Fast breathing, trouble breathing, nausea and vomiting, lightheadedness, severe weakness, tiredness, or confusion · Fever, chills, cough, sore throat, and body aches · Numbness, tingling, or burning pain in your hands, arms, legs, or feet · Seizures · Unusual bleeding or bruising · Vision changes, trouble seeing If you notice these less serious side effects, talk with your doctor:  
· Headache · Mild nausea, diarrhea, or vomiting If you notice other side effects that you think are caused by this medicine, tell your doctor. Call your doctor for medical advice about side effects. You may report side effects to FDA at 0-293-FDA-5076 © 2017 [address] Information is for End User's use only and may not be sold, redistributed or otherwise used for commercial purposes. The above information is an  only. It is not intended as medical advice for individual conditions or treatments. Talk to your doctor, nurse or pharmacist before following any medical regimen to see if it is safe and effective for you.

## 2017-09-25 NOTE — ED NOTES
Pt arrived c/o right knee pain and left hip pain that started a week ago, pt states he had blisters in those areas and popped them, states he attempted to clean them with rubbing alcohol, also states he was taking tylenol for the pain which did not help, took a friend's 10mg percocet today and that helped but it wore off, states pain now radiates down leg.

## 2017-09-26 ENCOUNTER — ANESTHESIA (OUTPATIENT)
Dept: SURGERY | Age: 42
DRG: 488 | End: 2017-09-26
Payer: SELF-PAY

## 2017-09-26 ENCOUNTER — APPOINTMENT (OUTPATIENT)
Dept: GENERAL RADIOLOGY | Age: 42
DRG: 488 | End: 2017-09-26
Attending: PHYSICIAN ASSISTANT
Payer: SELF-PAY

## 2017-09-26 ENCOUNTER — ANESTHESIA EVENT (OUTPATIENT)
Dept: SURGERY | Age: 42
DRG: 488 | End: 2017-09-26
Payer: SELF-PAY

## 2017-09-26 PROBLEM — L03.115 CELLULITIS OF RIGHT KNEE: Status: ACTIVE | Noted: 2017-09-26

## 2017-09-26 PROBLEM — N17.9 ARF (ACUTE RENAL FAILURE) (HCC): Status: ACTIVE | Noted: 2017-09-26

## 2017-09-26 PROBLEM — I10 HTN (HYPERTENSION): Status: ACTIVE | Noted: 2017-09-26

## 2017-09-26 LAB
ANION GAP SERPL CALC-SCNC: 9 MMOL/L (ref 3–18)
APTT PPP: 37.2 SEC (ref 23–36.4)
ATRIAL RATE: 103 BPM
BODY FLD TYPE: NORMAL
BUN SERPL-MCNC: 13 MG/DL (ref 7–18)
BUN/CREAT SERPL: 13 (ref 12–20)
CALCIUM SERPL-MCNC: 8.3 MG/DL (ref 8.5–10.1)
CALCULATED P AXIS, ECG09: 58 DEGREES
CALCULATED R AXIS, ECG10: 57 DEGREES
CALCULATED T AXIS, ECG11: 58 DEGREES
CHLORIDE SERPL-SCNC: 107 MMOL/L (ref 100–108)
CO2 SERPL-SCNC: 23 MMOL/L (ref 21–32)
CREAT SERPL-MCNC: 1.03 MG/DL (ref 0.6–1.3)
CRYSTALS FLD MICRO: NORMAL
DIAGNOSIS, 93000: NORMAL
ERYTHROCYTE [DISTWIDTH] IN BLOOD BY AUTOMATED COUNT: 13.7 % (ref 11.6–14.5)
GLUCOSE SERPL-MCNC: 100 MG/DL (ref 74–99)
HCT VFR BLD AUTO: 36.2 % (ref 36–48)
HGB BLD-MCNC: 12 G/DL (ref 13–16)
INR PPP: 1 (ref 0.8–1.2)
MCH RBC QN AUTO: 31 PG (ref 24–34)
MCHC RBC AUTO-ENTMCNC: 33.1 G/DL (ref 31–37)
MCV RBC AUTO: 93.5 FL (ref 74–97)
P-R INTERVAL, ECG05: 132 MS
PLATELET # BLD AUTO: 301 K/UL (ref 135–420)
PMV BLD AUTO: 10.5 FL (ref 9.2–11.8)
POTASSIUM SERPL-SCNC: 3.6 MMOL/L (ref 3.5–5.5)
PROTHROMBIN TIME: 12.8 SEC (ref 11.5–15.2)
Q-T INTERVAL, ECG07: 340 MS
QRS DURATION, ECG06: 84 MS
QTC CALCULATION (BEZET), ECG08: 445 MS
RBC # BLD AUTO: 3.87 M/UL (ref 4.7–5.5)
SODIUM SERPL-SCNC: 139 MMOL/L (ref 136–145)
VENTRICULAR RATE, ECG03: 103 BPM
WBC # BLD AUTO: 12.8 K/UL (ref 4.6–13.2)

## 2017-09-26 PROCEDURE — 97116 GAIT TRAINING THERAPY: CPT

## 2017-09-26 PROCEDURE — 77030013480 HC CUF TRNQT J&J -B: Performed by: SPECIALIST

## 2017-09-26 PROCEDURE — 77030020782 HC GWN BAIR PAWS FLX 3M -B: Performed by: SPECIALIST

## 2017-09-26 PROCEDURE — 87070 CULTURE OTHR SPECIMN AEROBIC: CPT | Performed by: INTERNAL MEDICINE

## 2017-09-26 PROCEDURE — 0SBC4ZZ EXCISION OF RIGHT KNEE JOINT, PERCUTANEOUS ENDOSCOPIC APPROACH: ICD-10-PCS | Performed by: SPECIALIST

## 2017-09-26 PROCEDURE — 77030022036 HC BLD SHV TOMCAT STRY -B: Performed by: SPECIALIST

## 2017-09-26 PROCEDURE — 89060 EXAM SYNOVIAL FLUID CRYSTALS: CPT | Performed by: SPECIALIST

## 2017-09-26 PROCEDURE — 76010000149 HC OR TIME 1 TO 1.5 HR: Performed by: SPECIALIST

## 2017-09-26 PROCEDURE — 93971 EXTREMITY STUDY: CPT

## 2017-09-26 PROCEDURE — 87070 CULTURE OTHR SPECIMN AEROBIC: CPT | Performed by: SPECIALIST

## 2017-09-26 PROCEDURE — 77030010509 HC AIRWY LMA MSK TELE -A: Performed by: ANESTHESIOLOGY

## 2017-09-26 PROCEDURE — 74011258636 HC RX REV CODE- 258/636: Performed by: SPECIALIST

## 2017-09-26 PROCEDURE — 36415 COLL VENOUS BLD VENIPUNCTURE: CPT | Performed by: INTERNAL MEDICINE

## 2017-09-26 PROCEDURE — 87075 CULTR BACTERIA EXCEPT BLOOD: CPT | Performed by: INTERNAL MEDICINE

## 2017-09-26 PROCEDURE — 74011000250 HC RX REV CODE- 250

## 2017-09-26 PROCEDURE — 74011250636 HC RX REV CODE- 250/636

## 2017-09-26 PROCEDURE — 77030018836 HC SOL IRR NACL ICUM -A: Performed by: SPECIALIST

## 2017-09-26 PROCEDURE — 76060000033 HC ANESTHESIA 1 TO 1.5 HR: Performed by: SPECIALIST

## 2017-09-26 PROCEDURE — 74011250637 HC RX REV CODE- 250/637: Performed by: INTERNAL MEDICINE

## 2017-09-26 PROCEDURE — 74011250636 HC RX REV CODE- 250/636: Performed by: NURSE ANESTHETIST, CERTIFIED REGISTERED

## 2017-09-26 PROCEDURE — 97161 PT EVAL LOW COMPLEX 20 MIN: CPT

## 2017-09-26 PROCEDURE — 65270000029 HC RM PRIVATE

## 2017-09-26 PROCEDURE — 87186 SC STD MICRODIL/AGAR DIL: CPT | Performed by: INTERNAL MEDICINE

## 2017-09-26 PROCEDURE — 80048 BASIC METABOLIC PNL TOTAL CA: CPT | Performed by: INTERNAL MEDICINE

## 2017-09-26 PROCEDURE — 74011250636 HC RX REV CODE- 250/636: Performed by: SPECIALIST

## 2017-09-26 PROCEDURE — 74011250636 HC RX REV CODE- 250/636: Performed by: PHYSICIAN ASSISTANT

## 2017-09-26 PROCEDURE — 77030027138 HC INCENT SPIROMETER -A

## 2017-09-26 PROCEDURE — 85027 COMPLETE CBC AUTOMATED: CPT | Performed by: INTERNAL MEDICINE

## 2017-09-26 PROCEDURE — A9585 GADOBUTROL INJECTION: HCPCS | Performed by: EMERGENCY MEDICINE

## 2017-09-26 PROCEDURE — 74011000258 HC RX REV CODE- 258: Performed by: PHYSICIAN ASSISTANT

## 2017-09-26 PROCEDURE — 74011250636 HC RX REV CODE- 250/636: Performed by: FAMILY MEDICINE

## 2017-09-26 PROCEDURE — 74011250636 HC RX REV CODE- 250/636: Performed by: INTERNAL MEDICINE

## 2017-09-26 PROCEDURE — 77030008574 HC TBNG SUC IRR STRY -B: Performed by: SPECIALIST

## 2017-09-26 PROCEDURE — 71010 XR CHEST PORT: CPT

## 2017-09-26 PROCEDURE — 77030012891

## 2017-09-26 PROCEDURE — 77030032490 HC SLV COMPR SCD KNE COVD -B: Performed by: SPECIALIST

## 2017-09-26 PROCEDURE — 74011250636 HC RX REV CODE- 250/636: Performed by: EMERGENCY MEDICINE

## 2017-09-26 PROCEDURE — 74011250637 HC RX REV CODE- 250/637: Performed by: SPECIALIST

## 2017-09-26 PROCEDURE — 93005 ELECTROCARDIOGRAM TRACING: CPT

## 2017-09-26 PROCEDURE — 76210000016 HC OR PH I REC 1 TO 1.5 HR: Performed by: SPECIALIST

## 2017-09-26 PROCEDURE — 77030002916 HC SUT ETHLN J&J -A: Performed by: SPECIALIST

## 2017-09-26 RX ORDER — MORPHINE SULFATE 2 MG/ML
2 INJECTION, SOLUTION INTRAMUSCULAR; INTRAVENOUS
Status: DISCONTINUED | OUTPATIENT
Start: 2017-09-26 | End: 2017-09-26

## 2017-09-26 RX ORDER — SODIUM CHLORIDE, SODIUM LACTATE, POTASSIUM CHLORIDE, CALCIUM CHLORIDE 600; 310; 30; 20 MG/100ML; MG/100ML; MG/100ML; MG/100ML
INJECTION, SOLUTION INTRAVENOUS
Status: DISCONTINUED | OUTPATIENT
Start: 2017-09-26 | End: 2017-09-26 | Stop reason: HOSPADM

## 2017-09-26 RX ORDER — MORPHINE SULFATE 4 MG/ML
4 INJECTION, SOLUTION INTRAMUSCULAR; INTRAVENOUS
Status: COMPLETED | OUTPATIENT
Start: 2017-09-26 | End: 2017-09-26

## 2017-09-26 RX ORDER — SODIUM CHLORIDE, SODIUM LACTATE, POTASSIUM CHLORIDE, CALCIUM CHLORIDE 600; 310; 30; 20 MG/100ML; MG/100ML; MG/100ML; MG/100ML
75 INJECTION, SOLUTION INTRAVENOUS CONTINUOUS
Status: DISCONTINUED | OUTPATIENT
Start: 2017-09-26 | End: 2017-09-26 | Stop reason: HOSPADM

## 2017-09-26 RX ORDER — PROPOFOL 10 MG/ML
INJECTION, EMULSION INTRAVENOUS AS NEEDED
Status: DISCONTINUED | OUTPATIENT
Start: 2017-09-26 | End: 2017-09-26 | Stop reason: HOSPADM

## 2017-09-26 RX ORDER — HYDRALAZINE HYDROCHLORIDE 20 MG/ML
10 INJECTION INTRAMUSCULAR; INTRAVENOUS
Status: DISCONTINUED | OUTPATIENT
Start: 2017-09-26 | End: 2017-09-29 | Stop reason: HOSPADM

## 2017-09-26 RX ORDER — MIDAZOLAM HYDROCHLORIDE 1 MG/ML
INJECTION, SOLUTION INTRAMUSCULAR; INTRAVENOUS AS NEEDED
Status: DISCONTINUED | OUTPATIENT
Start: 2017-09-26 | End: 2017-09-26 | Stop reason: HOSPADM

## 2017-09-26 RX ORDER — SODIUM CHLORIDE 0.9 % (FLUSH) 0.9 %
5-10 SYRINGE (ML) INJECTION EVERY 8 HOURS
Status: DISCONTINUED | OUTPATIENT
Start: 2017-09-26 | End: 2017-09-29 | Stop reason: HOSPADM

## 2017-09-26 RX ORDER — LABETALOL HCL 20 MG/4 ML
5 SYRINGE (ML) INTRAVENOUS AS NEEDED
Status: DISCONTINUED | OUTPATIENT
Start: 2017-09-26 | End: 2017-09-26 | Stop reason: HOSPADM

## 2017-09-26 RX ORDER — LABETALOL HCL 20 MG/4 ML
SYRINGE (ML) INTRAVENOUS
Status: COMPLETED
Start: 2017-09-26 | End: 2017-09-26

## 2017-09-26 RX ORDER — ONDANSETRON 2 MG/ML
INJECTION INTRAMUSCULAR; INTRAVENOUS AS NEEDED
Status: DISCONTINUED | OUTPATIENT
Start: 2017-09-26 | End: 2017-09-26 | Stop reason: HOSPADM

## 2017-09-26 RX ORDER — ACETAMINOPHEN 325 MG/1
650 TABLET ORAL
Status: DISCONTINUED | OUTPATIENT
Start: 2017-09-26 | End: 2017-09-29 | Stop reason: HOSPADM

## 2017-09-26 RX ORDER — OXYCODONE AND ACETAMINOPHEN 5; 325 MG/1; MG/1
1 TABLET ORAL
Status: DISCONTINUED | OUTPATIENT
Start: 2017-09-26 | End: 2017-09-26 | Stop reason: SDUPTHER

## 2017-09-26 RX ORDER — HYDROMORPHONE HYDROCHLORIDE 2 MG/ML
0.5 INJECTION, SOLUTION INTRAMUSCULAR; INTRAVENOUS; SUBCUTANEOUS AS NEEDED
Status: DISCONTINUED | OUTPATIENT
Start: 2017-09-26 | End: 2017-09-26 | Stop reason: HOSPADM

## 2017-09-26 RX ORDER — HYDROMORPHONE HYDROCHLORIDE 2 MG/ML
1 INJECTION, SOLUTION INTRAMUSCULAR; INTRAVENOUS; SUBCUTANEOUS
Status: DISCONTINUED | OUTPATIENT
Start: 2017-09-26 | End: 2017-09-29 | Stop reason: HOSPADM

## 2017-09-26 RX ORDER — SODIUM CHLORIDE 0.9 % (FLUSH) 0.9 %
5-10 SYRINGE (ML) INJECTION AS NEEDED
Status: DISCONTINUED | OUTPATIENT
Start: 2017-09-26 | End: 2017-09-26 | Stop reason: HOSPADM

## 2017-09-26 RX ORDER — FENTANYL CITRATE 50 UG/ML
INJECTION, SOLUTION INTRAMUSCULAR; INTRAVENOUS AS NEEDED
Status: DISCONTINUED | OUTPATIENT
Start: 2017-09-26 | End: 2017-09-26 | Stop reason: HOSPADM

## 2017-09-26 RX ORDER — OXYCODONE AND ACETAMINOPHEN 5; 325 MG/1; MG/1
1 TABLET ORAL
Status: DISCONTINUED | OUTPATIENT
Start: 2017-09-26 | End: 2017-09-26

## 2017-09-26 RX ORDER — MAGNESIUM SULFATE 100 %
4 CRYSTALS MISCELLANEOUS AS NEEDED
Status: DISCONTINUED | OUTPATIENT
Start: 2017-09-26 | End: 2017-09-26 | Stop reason: HOSPADM

## 2017-09-26 RX ORDER — HYDROMORPHONE HYDROCHLORIDE 1 MG/ML
INJECTION, SOLUTION INTRAMUSCULAR; INTRAVENOUS; SUBCUTANEOUS AS NEEDED
Status: DISCONTINUED | OUTPATIENT
Start: 2017-09-26 | End: 2017-09-26 | Stop reason: HOSPADM

## 2017-09-26 RX ORDER — DEXTROSE, SODIUM CHLORIDE, SODIUM LACTATE, POTASSIUM CHLORIDE, AND CALCIUM CHLORIDE 5; .6; .31; .03; .02 G/100ML; G/100ML; G/100ML; G/100ML; G/100ML
75 INJECTION, SOLUTION INTRAVENOUS CONTINUOUS
Status: DISCONTINUED | OUTPATIENT
Start: 2017-09-26 | End: 2017-09-26

## 2017-09-26 RX ORDER — NEOMYCIN SULFATE, POLYMYXIN B SULFATE, HYDROCORTISONE 3.5; 10000; 1 MG/ML; [USP'U]/ML; MG/ML
3-4 SOLUTION/ DROPS AURICULAR (OTIC) 4 TIMES DAILY
Status: DISCONTINUED | OUTPATIENT
Start: 2017-09-26 | End: 2017-09-29 | Stop reason: HOSPADM

## 2017-09-26 RX ORDER — SODIUM CHLORIDE 0.9 % (FLUSH) 0.9 %
5-10 SYRINGE (ML) INJECTION AS NEEDED
Status: DISCONTINUED | OUTPATIENT
Start: 2017-09-26 | End: 2017-09-29 | Stop reason: HOSPADM

## 2017-09-26 RX ORDER — MORPHINE SULFATE 4 MG/ML
4 INJECTION, SOLUTION INTRAMUSCULAR; INTRAVENOUS
Status: DISCONTINUED | OUTPATIENT
Start: 2017-09-26 | End: 2017-09-26 | Stop reason: SDUPTHER

## 2017-09-26 RX ORDER — MORPHINE SULFATE 4 MG/ML
INJECTION, SOLUTION INTRAMUSCULAR; INTRAVENOUS
Status: COMPLETED
Start: 2017-09-26 | End: 2017-09-26

## 2017-09-26 RX ORDER — ONDANSETRON 2 MG/ML
4 INJECTION INTRAMUSCULAR; INTRAVENOUS
Status: DISCONTINUED | OUTPATIENT
Start: 2017-09-26 | End: 2017-09-29 | Stop reason: HOSPADM

## 2017-09-26 RX ORDER — DEXTROSE 50 % IN WATER (D50W) INTRAVENOUS SYRINGE
25-50 AS NEEDED
Status: DISCONTINUED | OUTPATIENT
Start: 2017-09-26 | End: 2017-09-26 | Stop reason: HOSPADM

## 2017-09-26 RX ORDER — OXYCODONE AND ACETAMINOPHEN 5; 325 MG/1; MG/1
1 TABLET ORAL
Status: DISCONTINUED | OUTPATIENT
Start: 2017-09-26 | End: 2017-09-29 | Stop reason: HOSPADM

## 2017-09-26 RX ORDER — ONDANSETRON 2 MG/ML
4 INJECTION INTRAMUSCULAR; INTRAVENOUS ONCE
Status: COMPLETED | OUTPATIENT
Start: 2017-09-26 | End: 2017-09-26

## 2017-09-26 RX ORDER — CEFAZOLIN SODIUM 2 G/50ML
2 SOLUTION INTRAVENOUS EVERY 8 HOURS
Status: DISCONTINUED | OUTPATIENT
Start: 2017-09-26 | End: 2017-09-26

## 2017-09-26 RX ORDER — LIDOCAINE HYDROCHLORIDE 20 MG/ML
INJECTION, SOLUTION EPIDURAL; INFILTRATION; INTRACAUDAL; PERINEURAL AS NEEDED
Status: DISCONTINUED | OUTPATIENT
Start: 2017-09-26 | End: 2017-09-26 | Stop reason: HOSPADM

## 2017-09-26 RX ORDER — DOCUSATE SODIUM 100 MG/1
100 CAPSULE, LIQUID FILLED ORAL 2 TIMES DAILY
Status: DISCONTINUED | OUTPATIENT
Start: 2017-09-26 | End: 2017-09-29 | Stop reason: HOSPADM

## 2017-09-26 RX ADMIN — Medication 4 MG: at 06:59

## 2017-09-26 RX ADMIN — HYDROMORPHONE HYDROCHLORIDE 1 MG: 1 INJECTION, SOLUTION INTRAMUSCULAR; INTRAVENOUS; SUBCUTANEOUS at 11:25

## 2017-09-26 RX ADMIN — SODIUM CHLORIDE, SODIUM LACTATE, POTASSIUM CHLORIDE, CALCIUM CHLORIDE: 600; 310; 30; 20 INJECTION, SOLUTION INTRAVENOUS at 10:36

## 2017-09-26 RX ADMIN — PROPOFOL 200 MG: 10 INJECTION, EMULSION INTRAVENOUS at 10:44

## 2017-09-26 RX ADMIN — CEFEPIME HYDROCHLORIDE 2 G: 2 INJECTION, POWDER, FOR SOLUTION INTRAVENOUS at 06:13

## 2017-09-26 RX ADMIN — ONDANSETRON 4 MG: 2 INJECTION INTRAMUSCULAR; INTRAVENOUS at 10:57

## 2017-09-26 RX ADMIN — HYDROMORPHONE HYDROCHLORIDE 1 MG: 2 INJECTION, SOLUTION INTRAMUSCULAR; INTRAVENOUS; SUBCUTANEOUS at 22:45

## 2017-09-26 RX ADMIN — HYDRALAZINE HYDROCHLORIDE 10 MG: 20 INJECTION INTRAMUSCULAR; INTRAVENOUS at 02:00

## 2017-09-26 RX ADMIN — SODIUM CHLORIDE, SODIUM LACTATE, POTASSIUM CHLORIDE, CALCIUM CHLORIDE, AND DEXTROSE MONOHYDRATE 75 ML/HR: 600; 310; 30; 20; 5 INJECTION, SOLUTION INTRAVENOUS at 14:46

## 2017-09-26 RX ADMIN — LABETALOL HYDROCHLORIDE 5 MG: 5 INJECTION, SOLUTION INTRAVENOUS at 11:53

## 2017-09-26 RX ADMIN — CEFEPIME HYDROCHLORIDE 2 G: 2 INJECTION, POWDER, FOR SOLUTION INTRAVENOUS at 14:53

## 2017-09-26 RX ADMIN — Medication 10 ML: at 22:46

## 2017-09-26 RX ADMIN — LABETALOL HYDROCHLORIDE 5 MG: 5 INJECTION, SOLUTION INTRAVENOUS at 12:03

## 2017-09-26 RX ADMIN — MIDAZOLAM HYDROCHLORIDE 2 MG: 1 INJECTION, SOLUTION INTRAMUSCULAR; INTRAVENOUS at 10:36

## 2017-09-26 RX ADMIN — Medication 4 MG: at 02:56

## 2017-09-26 RX ADMIN — DOCUSATE SODIUM 100 MG: 100 CAPSULE, LIQUID FILLED ORAL at 17:44

## 2017-09-26 RX ADMIN — MEPERIDINE HYDROCHLORIDE 25 MG: 25 INJECTION INTRAMUSCULAR; INTRAVENOUS; SUBCUTANEOUS at 11:56

## 2017-09-26 RX ADMIN — OXYCODONE HYDROCHLORIDE AND ACETAMINOPHEN 1 TABLET: 5; 325 TABLET ORAL at 03:31

## 2017-09-26 RX ADMIN — SODIUM CHLORIDE 1000 MG: 900 INJECTION, SOLUTION INTRAVENOUS at 22:45

## 2017-09-26 RX ADMIN — FENTANYL CITRATE 50 MCG: 50 INJECTION, SOLUTION INTRAMUSCULAR; INTRAVENOUS at 10:36

## 2017-09-26 RX ADMIN — SODIUM CHLORIDE 1000 MG: 900 INJECTION, SOLUTION INTRAVENOUS at 15:08

## 2017-09-26 RX ADMIN — OXYCODONE HYDROCHLORIDE AND ACETAMINOPHEN 1 TABLET: 5; 325 TABLET ORAL at 16:32

## 2017-09-26 RX ADMIN — Medication 4 MG: at 00:56

## 2017-09-26 RX ADMIN — VANCOMYCIN HYDROCHLORIDE 1500 MG: 10 INJECTION, POWDER, LYOPHILIZED, FOR SOLUTION INTRAVENOUS at 00:56

## 2017-09-26 RX ADMIN — MORPHINE SULFATE 4 MG: 4 INJECTION, SOLUTION INTRAMUSCULAR; INTRAVENOUS at 00:56

## 2017-09-26 RX ADMIN — CEFEPIME HYDROCHLORIDE 2 G: 2 INJECTION, POWDER, FOR SOLUTION INTRAVENOUS at 20:24

## 2017-09-26 RX ADMIN — NEOMYCIN SULFATE, POLYMYXIN B SULFATE, HYDROCORTISONE 3 DROP: 3.5; 10000; 1 SOLUTION/ DROPS AURICULAR (OTIC) at 18:42

## 2017-09-26 RX ADMIN — ONDANSETRON 4 MG: 2 INJECTION INTRAMUSCULAR; INTRAVENOUS at 11:56

## 2017-09-26 RX ADMIN — HYDROMORPHONE HYDROCHLORIDE 1 MG: 2 INJECTION, SOLUTION INTRAMUSCULAR; INTRAVENOUS; SUBCUTANEOUS at 14:50

## 2017-09-26 RX ADMIN — GADOBUTROL 7.5 ML: 604.72 INJECTION INTRAVENOUS at 00:12

## 2017-09-26 RX ADMIN — HYDRALAZINE HYDROCHLORIDE 10 MG: 20 INJECTION INTRAMUSCULAR; INTRAVENOUS at 08:42

## 2017-09-26 RX ADMIN — OXYCODONE HYDROCHLORIDE AND ACETAMINOPHEN 1 TABLET: 5; 325 TABLET ORAL at 20:24

## 2017-09-26 RX ADMIN — LIDOCAINE HYDROCHLORIDE 100 MG: 20 INJECTION, SOLUTION EPIDURAL; INFILTRATION; INTRACAUDAL; PERINEURAL at 10:44

## 2017-09-26 RX ADMIN — FENTANYL CITRATE 100 MCG: 50 INJECTION, SOLUTION INTRAMUSCULAR; INTRAVENOUS at 11:00

## 2017-09-26 RX ADMIN — NEOMYCIN SULFATE, POLYMYXIN B SULFATE, HYDROCORTISONE 4 DROP: 3.5; 10000; 1 SOLUTION/ DROPS AURICULAR (OTIC) at 20:43

## 2017-09-26 NOTE — PROCEDURES
[facility]  [facility] ***    Name: Deven Longoria  MRN: [id number]  : 1975  HIS Order #: [id number]  [address] [facility] Visit #: [id number]  Date: 26 Sep 2017    TYPE OF TEST: Peripheral Venous Testing    REASON FOR TEST  Pain in limb, Limb swelling    Right Leg:-  Deep venous thrombosis:           No  Superficial venous thrombosis:    No  Deep venous insufficiency:        Not examined  Superficial venous insufficiency: Not examined      INTERPRETATION/FINDINGS  Right leg :  Duplex images were obtained using 2-D gray scale, color flow, and  spectral Doppler analysis. 1. No evidence of deep venous thrombosis detected in the veins  visualized. 2. Deep veins visualized include the common femoral, femoral,  popliteal, posterior tibial and peroneal veins. 3. No evidence of superficial thrombosis detected. 4. Superficial veins visualized include the proximal great saphenous  vein. 5. No evidence of deep vein thrombosis in the contralateral common  femoral vein. ADDITIONAL COMMENTS    I have personally reviewed the data relevant to the interpretation of  this  study.     TECHNOLOGIST: Ruthie Isabel RVT  Signed: 2017 12:52 AM    PHYSICIAN: Tiana Ward MD  Signed: 2017 09:19 AM

## 2017-09-26 NOTE — PROGRESS NOTES
Problem: Mobility Impaired (Adult and Pediatric)  Goal: *Acute Goals and Plan of Care (Insert Text)  Physical Therapy Goals  Initiated 9/26/2017 and to be accomplished within 7 day(s)  1. Patient will move from supine to sit and sit to supine , scoot up and down and roll side to side in bed with modified independence. 2. Patient will transfer from bed to chair and chair to bed with modified independence using the least restrictive device. 3. Patient will perform sit to stand with modified independence. 4. Patient will ambulate with modified independence for >150 feet with the least restrictive device. 5. Patient will ascend/descend 2 stairs with 2 handrail(s) with modified independence. PHYSICAL THERAPY EVALUATION     Patient: Gabriel Flores (87 y.o. male)  Date: 9/26/2017  Primary Diagnosis: septic right knee  Septic arthritis of knee, right (HCC)  Procedure(s) (LRB):  KNEE ARTHROSCOPY/INCISION AND DRAINAGE WITH WASHOUT (Right) Day of Surgery   Precautions: Fall      ASSESSMENT :  Pt presents today alert and agreeable to therapy and was supine in bed upon arrival. Pt initially reports that he doesn't think he can do much moving, however is agreeable to sitting EOB for objective assessment. Once sitting EOB, pt requests to use bathroom; pt seems in a rush and required safety awareness as pt reports he wanted to ambulated to bathroom without RW. Pt also educated of use of RW as well as WB status. Pt used RW to ambulated total 40ft and stood to use the bathroom. Pt required VC's on keeping RLE within Fran of walker as pt holding RLE outside and posterior to walker; Pt also required VC's to keep walker closer to Fran. Once supine pt was left resting with call bell by his side RN notified of pt request for pain meds. Pt demonstrates decreased strength, mobility, and endurance and will benefit from skilled intervention to address the above impairments.   Patients rehabilitation potential is considered to be Good  Factors which may influence rehabilitation potential include:   [ ]         None noted  [ ]         Mental ability/status  [X]         Medical condition  [X]         Home/family situation and support systems  [X]         Safety awareness  [X]         Pain tolerance/management  [ ]         Other:        PLAN :  Recommendations and Planned Interventions:  [X]           Bed Mobility Training             [X]    Neuromuscular Re-Education  [X]           Transfer Training                   [ ]    Orthotic/Prosthetic Training  [X]           Gait Training                          [ ]    Modalities  [X]           Therapeutic Exercises          [ ]    Edema Management/Control  [X]           Therapeutic Activities            [X]    Patient and Family Training/Education  [ ]           Other (comment):     Frequency/Duration: Patient will be followed by physical therapy 1-2 times per day/4-7 days per week to address goals. Discharge Recommendations: Home Health  Further Equipment Recommendations for Discharge: rolling walker       G-CODES      Mobility  Current  CI= 1-19%   Goal  CI= 1-19%. The severity rating is based on the Level of Assistance required for Functional Mobility and ADLs.            G-CODES      Eval Complexity: History: MEDIUM  Complexity : 1-2 comorbidities / personal factors will impact the outcome/ POC Exam:LOW Complexity : 1-2 Standardized tests and measures addressing body structure, function, activity limitation and / or participation in recreation  Presentation: LOW Complexity : Stable, uncomplicated  Clinical Decision Making:Low Complexity   Overall Complexity:LOW       SUBJECTIVE:   Patient stated I'm not going to be doing much movement today.       OBJECTIVE DATA SUMMARY:   History reviewed. No pertinent past medical history. History reviewed. No pertinent surgical history.   Prior Level of Function/Home Situation: Pt lives alone in 1 story home with 2STE with BHR and was independent with mobility and I/ADL's. Critical Behavior:   A&Ox4   Strength:    Strength: Within functional limits (LLE 5/5; DNT R as pt refusing to move RLE)   Tone & Sensation:   Tone: Normal (BLE)   Sensation: Intact (BLE to LT)   Range Of Motion:  AROM:  (Pt did not tolerate activity)   Functional Mobility:  Bed Mobility:   Supine to Sit: Supervision  Sit to Supine: Contact guard assistance  Scooting: Contact guard assistance  Transfers:  Sit to Stand: Contact guard assistance  Stand to Sit: Contact guard assistance      Balance:   Sitting: Intact  Standing: Impaired; With support  Standing - Static: Good  Standing - Dynamic : Fair  Ambulation/Gait Training:  Distance (ft): 35 Feet (ft)  Assistive Device: Walker, rolling  Ambulation - Level of Assistance: Contact guard assistance    Gait Abnormalities: Antalgic;Decreased step clearance; Step to gait   Base of Support: Narrowed   Speed/Genevieve: Slow   Interventions: Verbal cues; Visual/Demos  Pain:  Pt reports 9/10 pain or discomfort prior to treatment. Pt reports 9/10 pain or discomfort post treatment. Activity Tolerance:   Pt demonstrated decreased tolerance to activity 2/2 pain and would not tolerate WB or ROM of RLE. Please refer to the flowsheet for vital signs taken during this treatment. After treatment:   [ ]         Patient left in no apparent distress sitting up in chair  [X]         Patient left in no apparent distress in bed  [X]         Call bell left within reach  [X]         Nursing notified  [ ]         Caregiver present  [ ]         Bed alarm activated      COMMUNICATION/EDUCATION:   [X]         Fall prevention education was provided and the patient/caregiver indicated understanding. [X]         Patient/family have participated as able in goal setting and plan of care. [X]         Patient/family agree to work toward stated goals and plan of care.   [ ]         Patient understands intent and goals of therapy, but is neutral about his/her participation. [ ]         Patient is unable to participate in goal setting and plan of care.      Thank you for this referral.  Omkar Coleman, PT   Time Calculation: 23 mins

## 2017-09-26 NOTE — ANESTHESIA POSTPROCEDURE EVALUATION
Post-Anesthesia Evaluation and Assessment    Patient: Guerline Russell MRN: [id number]  SSN: [id number]    YOB: 1975  Age: 43 y.o. Sex: male       Cardiovascular Function/Vital Signs  Visit Vitals    /88    Pulse 96    Temp 37.2 °C (99 °F)    Resp 18    Ht 5' 5\" (1.651 m)    Wt 77.1 kg (170 lb)    SpO2 99%    BMI 28.29 kg/m2       Patient is status post general anesthesia for Procedure(s):  KNEE ARTHROSCOPY/INCISION AND DRAINAGE WITH WASHOUT. Nausea/Vomiting: None    Postoperative hydration reviewed and adequate. Pain:  Pain Scale 1: Numeric (0 - 10) (09/26/17 1139)  Pain Intensity 1: 0 (09/26/17 1139)   Managed    Neurological Status:   Neuro (WDL): Within Defined Limits (09/26/17 1139)   At baseline    Mental Status and Level of Consciousness: Arousable    Pulmonary Status:   O2 Device: Room air (09/26/17 0200)   Adequate oxygenation and airway patent    Complications related to anesthesia: None    Post-anesthesia assessment completed.  No concerns    Signed By: Pam Roa MD     September 26, 2017

## 2017-09-26 NOTE — PROGRESS NOTES
SUBJECTIVE:    States he feels sleepy. Just came back from OR. Denies for CP/SOB/N/V. Hunger pain present    OBJECTIVE:    Visit Vitals    /90 (BP 1 Location: Left arm, BP Patient Position: At rest)    Pulse 95    Temp 98.3 °F (36.8 °C)    Resp 18    Ht 5' 5\" (1.651 m)    Wt 77.1 kg (170 lb)    SpO2 100%    BMI 28.29 kg/m2     CVS: RRR  RS: CTA bilaterally  GI: NT, ND, BS +  Extremities: Right knee dressing in situ  General: NAD, Awake    ASSESSMENT:    1. Septic right knee synovitis s/p Right knee arthroscopy, incision and  drainage with washout septic knee, partial arthroscopic synovectomy.   2. HTN  3. KARLEY, resolved    PLAN:    Cont antibiotic  ID consult pending  Cont current management     CMP:   Lab Results   Component Value Date/Time     09/26/2017 04:05 AM    K 3.6 09/26/2017 04:05 AM     09/26/2017 04:05 AM    CO2 23 09/26/2017 04:05 AM    AGAP 9 09/26/2017 04:05 AM     (H) 09/26/2017 04:05 AM    BUN 13 09/26/2017 04:05 AM    CREA 1.03 09/26/2017 04:05 AM    GFRAA >60 09/26/2017 04:05 AM    GFRNA >60 09/26/2017 04:05 AM    CA 8.3 (L) 09/26/2017 04:05 AM     CBC:   Lab Results   Component Value Date/Time    WBC 12.8 09/26/2017 04:05 AM    HGB 12.0 (L) 09/26/2017 04:05 AM    HCT 36.2 09/26/2017 04:05 AM     09/26/2017 04:05 AM

## 2017-09-26 NOTE — ROUTINE PROCESS
Bedside and Verbal shift change report given to CORDELL Dennis (oncoming nurse) by Sanam Key RN (offgoing nurse). Report included the following information SBAR, Kardex, MAR and Recent Results. SITUATION:    Code Status: Full Code   Reason for Admission: septic right knee   Septic arthritis of knee, right ([facility]    [facility] day: 1   Problem List:       Hospital Problems  Never Reviewed          Codes Class Noted POA    HTN (hypertension) ICD-10-CM: I10  ICD-9-CM: 401.9  9/26/2017 Yes        ARF (acute renal failure) ([facility] ICD-10-CM: N17.9  ICD-9-CM: 584.9  9/26/2017 Yes        Cellulitis of right knee ICD-10-CM: K90.960  ICD-9-CM: 682.6  9/26/2017 Unknown        * (Principal)Septic arthritis of knee, right ([facility] ICD-10-CM: M00.9  ICD-9-CM: 711.06  9/25/2017 Yes              BACKGROUND:    Past Medical History: History reviewed. No pertinent past medical history. Patient taking anticoagulants no     ASSESSMENT:    Changes in Assessment Throughout Shift: no     Patient has Central Line: no Reasons if yes: .  Patient has Joseph Cath: no Reasons if yes: no      Last Vitals:     Vitals:    09/26/17 1215 09/26/17 1228 09/26/17 1235 09/26/17 1321   BP: 148/87 151/88 150/81 154/90   Pulse: 97 96 96 95   Resp: 17 18 19 18   Temp:    98.3 °F (36.8 °C)   SpO2: 99% 99% 98% 100%   Weight:       Height:            IV and DRAINS (will only show if present)   Peripheral IV 09/25/17 Right; Lower Antecubital-Site Assessment: Clean, dry, & intact     WOUND (if present)   Wound Type:  surg incision Rt. Knee   Dressing present Dressing Present : No   Wound Concerns/Notes:  none     PAIN    Pain Assessment    Pain Intensity 1: 7 (09/26/17 1633)    Pain Location 1: Knee    Pain Intervention(s) 1: Medication (see MAR)    Patient Stated Pain Goal: 0  o Interventions for Pain:  See mar  o Intervention effective: yes  o Time of last intervention: see mar   o Reassessment Completed: yes      Last 3 Weights:  Last 3 Recorded Weights in this Encounter    09/25/17 2119   Weight: 77.1 kg (170 lb)     Weight change:      INTAKE/OUPUT    Current Shift:      Last three shifts: 09/25 0701 - 09/26 1900  In: 800 [I.V.:800]  Out: -      LAB RESULTS     Recent Labs      09/26/17 0405 09/25/17 1955   WBC  12.8  12.3   HGB  12.0*  12.3*   HCT  36.2  36.9   PLT  301  314        Recent Labs      09/26/17 0405 09/25/17 1955   NA  139  140   K  3.6  3.7   GLU  100*  99   BUN  13  18   CREA  1.03  1.51*   CA  8.3*  8.9   INR   --   1.0       RECOMMENDATIONS AND DISCHARGE PLANNING     1. Pending tests/procedures/ Plan of Care or Other Needs: no     2. Discharge plan for patient and Needs/Barriers: home    3. Estimated Discharge Date: . Posted on Whiteboard in [address] [place] [address] Room: .      4. The patient's care plan was reviewed with the oncoming nurse. \"HEALS\" SAFETY CHECK      Fall Risk    Total Score: 2    Safety Measures: Safety Measures: Bed/Chair-Wheels locked, Bed in low position, Call light within reach    A safety check occurred in the patient's room between off going nurse and oncoming nurse listed above. The safety check included the below items  Area Items   H  High Alert Medications - Verify all high alert medication drips (heparin, PCA, etc.)   E  Equipment - Suction is set up for ALL patients (with yanker)  - Red plugs utilized for all equipment (IV pumps, etc.)  - WOWs wiped down at end of shift.  - Room stocked with oxygen, suction, and other unit-specific supplies   A  Alarms - Bed alarm is set for fall risk patients  - Ensure chair alarm is in place and activated if patient is up in a chair   L  Lines - Check IV for any infiltration  - Joseph bag is empty if patient has a Joseph   - Tubing and IV bags are labeled   S  Safety   - Room is clean, patient is clean, and equipment is clean. - Hallways are clear from equipment besides carts.    - Fall bracelet on for fall risk patients  - Ensure room is clear and free of clutter  - Suction is set up for ALL patients (with scooter)  - Hallways are clear from equipment besides carts.    - Isolation precautions followed, supplies available outside room, sign posted     Gerardo Hameed RN

## 2017-09-26 NOTE — BRIEF OP NOTE
BRIEF OPERATIVE NOTE    Date of Procedure: 9/26/2017   Preoperative Diagnosis: septic right knee, synovitis  Postoperative Diagnosis: same    Procedure(s):  KNEE ARTHROSCOPY/INCISION AND DRAINAGE WITH WASHOUT, PARTIAL SYNOVECTOMY  Surgeon(s) and Role:     * Corey Eckert MD - Primary         Assistant Staff:       Surgical Staff:  Circ-1: Johnathan Lawrence  Scrub Tech-1: Eileen Rojo  Surg Asst-1: Génesis Matson  Event Time In   Incision Start 1056   Incision Close 1125     Anesthesia: General   Estimated Blood Loss: min  Specimens:   ID Type Source Tests Collected by Time Destination   1 : anaerobic & aerobic culture right knee wound Wound Knee, right CULTURE, ANAEROBIC AND AEROBIC Corey Eckert MD 9/26/2017 1108 Microbiology      Findings: above   Complications: none  Implants: * No implants in log *

## 2017-09-26 NOTE — PERIOP NOTES
TRANSFER - OUT REPORT:    Verbal report given to Liane LANDA(name) on Angela Garcia  being transferred to [address] [place] [address](unit) for routine post - op       Report consisted of patients Situation, Background, Assessment and   Recommendations(SBAR). Information from the following report(s) SBAR, OR Summary, Procedure Summary, Intake/Output and MAR was reviewed with the receiving nurse. Lines:   Peripheral IV 09/25/17 Right; Lower Antecubital (Active)   Site Assessment Clean, dry, & intact 9/26/2017 11:39 AM   Phlebitis Assessment 0 9/26/2017 11:39 AM   Infiltration Assessment 0 9/26/2017 11:39 AM   Dressing Status Clean, dry, & intact 9/26/2017 11:39 AM   Dressing Type Transparent;Tape 9/26/2017 11:39 AM   Hub Color/Line Status Green; Infusing 9/26/2017 11:39 AM        Opportunity for questions and clarification was provided. Patient transported with:   O2 @ 3 liters  Tech          .

## 2017-09-26 NOTE — ED NOTES
Pt ambulating to bathroom. Pt in extreme pain.   Pt refuses urinal, bedside commode or even to be take via wheelchair

## 2017-09-26 NOTE — CONSULTS
Consult    Patient: Gabriel Flores MRN: [id number]  SSN: [id number]    YOB: 1975  Age: 43 y.o. Sex: male      Subjective:      Gabriel Flores is a 43 y.o. male who is being seen for right knee swelling and pain. Patient noted pimples in the left groin and over the right knee 1 week ago. The pustules ruptured and he cleaned the areas with alcohol. Yesterday he noted progressive pain swelling and redness about the right knee. His pain level increased to 9/10. He has been able to bear weight with pain. He does not recall any injury or trauma. History reviewed. No pertinent past medical history. History reviewed. No pertinent surgical history. History reviewed. No pertinent family history.   Social History   Substance Use Topics    Smoking status: Not on file    Smokeless tobacco: Not on file    Alcohol use Not on file      Current Facility-Administered Medications   Medication Dose Route Frequency Provider Last Rate Last Dose    ondansetron (ZOFRAN) injection 4 mg  4 mg IntraVENous Q4H PRN Tohny Garcia MD        hydrALAZINE (APRESOLINE) 20 mg/mL injection 10 mg  10 mg IntraVENous Q6H PRN Thony Garcia MD   10 mg at 09/26/17 0842    morphine injection 4 mg  4 mg IntraVENous Q4H PRN Thony Garcia MD   4 mg at 09/26/17 0659    oxyCODONE-acetaminophen (PERCOCET) 5-325 mg per tablet 1 Tab  1 Tab Oral Q4H PRN Thony Garcia MD   1 Tab at 09/26/17 0331    cefepime (MAXIPIME) 2 g in 0.9% sodium chloride (MBP/ADV) 100 mL MBP  2 g IntraVENous Q8H Mamadou Garcia PA-C 200 mL/hr at 09/26/17 0613 2 g at 09/26/17 5592    vancomycin (VANCOCIN) 750 mg in 0.9% sodium chloride 250 mL IVPB  750 mg IntraVENous Q12H Mamadou Garcia PA-C        [START ON 9/27/2017] Vancomycin trough due prior to 11:00 am dose on 9-   Other ONCE Mamadou MICHAEL Garcia PA-C            No Known Allergies    Review of Systems:  A comprehensive review of systems was negative except for that written in the History of Present Illness. Objective:     Vitals:    09/25/17 2200 09/26/17 0200 09/26/17 0405 09/26/17 0815   BP: 175/88 167/75 (!) 171/91 (!) 165/95   Pulse: 88 100 95 92   Resp: 14 16 16 17   Temp: 99.2 °F (37.3 °C) 99.4 °F (37.4 °C) 99.6 °F (37.6 °C) 98.8 °F (37.1 °C)   SpO2: 99% 98% 100% 99%   Weight:       Height:            Physical Exam:  GENERAL: alert, cooperative, mild distress, appears stated age  Right knee:  Tender, warm, red 2+effusion    ROM 50-10    2+ edema  Assessment:     Hospital Problems  Never Reviewed          Codes Class Noted POA    HTN (hypertension) ICD-10-CM: I10  ICD-9-CM: 401.9  9/26/2017 Yes        ARF (acute renal failure) ([facility]) ICD-10-CM: N17.9  ICD-9-CM: 584.9  9/26/2017 Yes        Cellulitis of right knee ICD-10-CM: L03.115  ICD-9-CM: 682.6  9/26/2017 Unknown        * (Principal)Septic arthritis of knee, right (HCC) ICD-10-CM: M00.9  ICD-9-CM: 711.06  9/25/2017 Yes              Plan:     Right knee aspirated 10 cc slightly cloudy, blood tinged synovial fluid  To OR for arthroscopic washout + cultures  Risks of surgery outlined and informed consent obtained.     Signed By: Norma Dyson MD     September 26, 2017

## 2017-09-26 NOTE — ED NOTES
TRANSFER - OUT REPORT:    Verbal report given to Darian Benson RN (name) on Demetrio Cox  being transferred to  (unit) for routine progression of care       Report consisted of patients Situation, Background, Assessment and   Recommendations(SBAR). Information from the following report(s) SBAR, ED Summary and Intake/Output was reviewed with the receiving nurse. Lines:   Peripheral IV 09/25/17 Right; Lower Antecubital (Active)   Site Assessment Clean, dry, & intact 9/25/2017  8:10 PM   Phlebitis Assessment 0 9/25/2017  8:10 PM   Infiltration Assessment 0 9/25/2017  8:10 PM   Dressing Status Clean, dry, & intact 9/25/2017  8:10 PM   Dressing Type Transparent 9/25/2017  8:10 PM        Opportunity for questions and clarification was provided.       Patient transported with:   [employer]

## 2017-09-26 NOTE — H&P
History & Physical    Patient: Jone Bertrand MRN: [id number]  CSN: [id number]    YOB: 1975  Age: 43 y.o. Sex: male      DOA: 9/25/2017    Chief Complaint:   Chief Complaint   Patient presents with    Skin Problem          HPI:     Jone Bertrand is a 43 y.o.  male who states that he had a pimple on his R knee that he burst and applied rubbing alcohol approximately 1 week ago. Pt states the RLE has been hurting but today the pain and swelling was unbearable. He presented to ER for evaluation. Upon arrival he was hypertensive 187/97. Labs were sig for mild leukocytosis and elevated Cr=1.5 (no baseline recorded). XR R knee was sig for \"Hyperextension of the knee limits evaluation for suprapatellar joint effusion given the reported history of suspected septic arthritis. There is no definite bony erosion. No definite acute fracture. The patella is borderline high. This could be positional and related to hyperextension of the knee. However, correlate clinically for patellar tendon integrity. \" Ortho was consulted recommending surgical washout in AM. NPO after MN. No past medical history on file. No past surgical history on file. No family history on file. Social History     Social History    Marital status:      Spouse name: N/A    Number of children: N/A    Years of education: N/A     Social History Main Topics    Smoking status: Not on file    Smokeless tobacco: Not on file    Alcohol use Not on file    Drug use: Not on file    Sexual activity: Not on file     Other Topics Concern    Not on file     Social History Narrative       Prior to Admission medications    Medication Sig Start Date End Date Taking? Authorizing Provider   oxyCODONE-acetaminophen (PERCOCET) 5-325 mg per tablet Take 1 Tab by mouth every eight (8) hours as needed for Pain.  6/18/16   SHANTI Mireles   neomycin-polymyxin-hydrocortisone (CORTISPORIN) otic solution Administer 3-4 Drops in right ear four (4) times daily. For 10 days 16   SHANTI Beal       No Known Allergies      Review of Systems  GENERAL: Patient alert, awake and oriented times 3, able to communicate full sentences and not in distress. HEENT: No change in vision, no earache, tinnitus, sore throat or sinus congestion. NECK: No pain or stiffness. PULMONARY: No shortness of breath, cough or wheeze. Cardiovascular: no pnd or orthopnea, no CP  GASTROINTESTINAL: No abdominal pain, nausea, vomiting or diarrhea, melena or bright red blood per rectum. GENITOURINARY: No urinary frequency, urgency, hesitancy or dysuria. MUSCULOSKELETAL: R knee pain, decreased ROM and extensive RLE swelling. No back pain. DERMATOLOGIC: No rash, no itching, no lesions. ENDOCRINE: No polyuria, polydipsia, no heat or cold intolerance. No recent change in weight. HEMATOLOGICAL: No anemia or easy bruising or bleeding. NEUROLOGIC: No headache, seizures, numbness, tingling or weakness. Physical Exam:     Physical Exam:  Visit Vitals    /88    Pulse 88    Temp 99.2 °F (37.3 °C)    Resp 14    Ht 5' 5\" (1.651 m)    Wt 77.1 kg (170 lb)    SpO2 99%    BMI 28.29 kg/m2      O2 Device: Room air    Temp (24hrs), Av.7 °F (37.1 °C), Min:98.2 °F (36.8 °C), Max:99.2 °F (37.3 °C)             General:  Alert, cooperative, no distress, appears stated age. Head: Normocephalic, without obvious abnormality, atraumatic. Eyes:  Conjunctivae/corneas clear. PERRL, EOMs intact. Nose: Nares normal. No drainage or sinus tenderness. Neck: Supple, symmetrical, trachea midline, no adenopathy, thyroid: no enlargement, no carotid bruit and no JVD. Lungs:   Clear to auscultation bilaterally. Heart:  Regular rate and rhythm, S1, S2 normal.     Abdomen: Soft, non-tender. Bowel sounds normal.    Extremities: RLE edema, extensive knee swelling, +warmth, erythema but no induration.     Pulses: 2+ and symmetric all extremities. Skin:  No rashes or lesions   Neurologic: AAOx3, No focal motor or sensory deficit. Labs Reviewed:    Lab results reviewed. For significant abnormal values and values requiring intervention, see assessment and plan. XR knee and EKG    Procedures/imaging: see electronic medical records for all procedures/Xrays and details which were not copied into this note but were reviewed prior to creation of Plan      Assessment/Plan     Active Problems:    Septic arthritis of knee, right ([facility] [address] (9/25/2017)      HTN (hypertension) (9/26/2017)      ARF (acute renal failure) ([facility] [address] (9/26/2017)    Pt has been provided gentle IVF hydration. Added IV hydralazine for elevated BP. Pt is c/o pain which maybe etiology for the acute hypertension. Cont prn IV morphine and prn po Percocet for pain mgmt. Ortho is scheduled to take Pt to OR for arthorcentesis and washout. In the interim, blood cultures have been obtained and Pt was started on empiric ABx of Vanc and Cefipime. PVL RLE was neg for DVT. MRI RLE results are pending. Recheck CBC/BMP in am.     DVT/GI Prophylaxis: SCD's    Discussed with patient at bedside about hospital admission and my plan care, he understood and agree with my plan care.     Marga Vale MD  9/26/2017 12:50 AM

## 2017-09-26 NOTE — ANESTHESIA PREPROCEDURE EVALUATION
Anesthetic History               Review of Systems / Medical History  Patient summary reviewed and pertinent labs reviewed    Pulmonary          Smoker         Neuro/Psych   Within defined limits           Cardiovascular    Hypertension: well controlled              Exercise tolerance: >4 METS     GI/Hepatic/Renal                Endo/Other            Comments: Septic knee Other Findings   Comments:   Risk Factors for Postoperative nausea/vomiting:       History of postoperative nausea/vomiting? NO       Female? NO       Motion sickness? NO       Intended opioid administration for postoperative analgesia? YES      Smoking Abstinence  Current Smoker? YES  Elective Surgery? YES  Seen preoperatively by anesthesiologist or proxy prior to day of surgery? YES  Pt abstained from smoking 24 hours prior to anesthesia?  NO           Physical Exam    Airway  Mallampati: II  TM Distance: > 6 cm  Neck ROM: normal range of motion   Mouth opening: Normal     Cardiovascular  Regular rate and rhythm,  S1 and S2 normal,  no murmur, click, rub, or gallop             Dental  No notable dental hx       Pulmonary  Breath sounds clear to auscultation               Abdominal  GI exam deferred       Other Findings            Anesthetic Plan    ASA: 2  Anesthesia type: general          Induction: Intravenous  Anesthetic plan and risks discussed with: Patient

## 2017-09-26 NOTE — ED NOTES
Pt states he had a \"pimple\" to his right knee a week ago, he popped it and had no issues, states this morning he woke up and his knee was swollen and painful, progressively got more red and swollen throughout the day, pt now has small wound on right knee and surrounding area is hot to touch indurated and painful.

## 2017-09-26 NOTE — ROUTINE PROCESS
Verbal and bedside Shift changed report given to Deepika Peacock RN (oncoming RN) on Pt. Condition. Report consisted of patients Situation, History, Activities, intake/output,  Background, Assessment and Recommendations(SBAR). Information from the following report(s) Kardex, order Summary, Lab results and MAR was reviewed with the receiving nurse. Opportunity for questions and clarification was provided.

## 2017-09-26 NOTE — OP NOTES
[address]    Name:  Kala Landaverde  MR#:  [id number]  :  1975  Account #:  [de-identified]  Date of Adm:  2017  Date of Surgery:  2017      PREOPERATIVE DIAGNOSES: Septic right knee; synovitis, right  knee. POSTOPERATIVE DIAGNOSES: Septic right knee; synovitis, right  knee. PROCEDURES PERFORMED: Right knee arthroscopy, incision and  drainage with washout septic knee, partial arthroscopic synovectomy. SURGEON: Mardi Burkitt, MD    ANESTHESIA: General.    CONDITION: Good. ESTIMATED BLOOD LOSS: Minimal.    SPECIMENS REMOVED: Aerobic and anaerobic cultures. DESCRIPTION OF PROCEDURE: Under satisfactory general  anesthesia, the patient in the supine position with his right leg in an  arthroscopy leg green, the patient's right lower extremity was prepped  with ChloraPrep solution and draped in the usual fashion for knee  arthroscopy. The patient's right lower extremity was exsanguinated  with an Esmarch bandage and the tourniquet was inflated to 350  mmHg. The arthroscope was placed through an inferolateral portal and a  diagnostic arthroscopy was carried out. There was synovitis throughout  the suprapatellar pouch along the anterior joint line. A partial  synovectomy was carried out using the 3.5 mm Tomcat shaver. Prior  to irrigating the knee, aerobic and anaerobic cultures were taken of the  synovial fluid, which was somewhat cloudy. The knee was copiously irrigated throughout the arthroscopic  procedure back to clear fluid. A small open area along the lateral  aspect of the right knee was debrided with a 15 blade back to clean  healthy tissue. An arthroscopy dressing was applied. The patient was  transported back to the recovery room in good condition. Sponge and  needle counts were correct.         MD MURALI Salazar / JAIME  D:  2017   13:08  T:  2017   13:23  Job #:  [id number]

## 2017-09-26 NOTE — ROUTINE PROCESS
0485  Received report from Jesse Corona ED RN. Pt is AOX 4 transferred to bed without a problem. Pt.  Educated on room equipments and call bell  when in need of help and assistance. .Pt. Educated on MD's  orders and plans for the evening. Pt. Verbalized understanding. Pt. Denies discomfort. Will continue to monitor Pt. Condition. 8260 given percocet 5mg per [facility] for pain management. 0400  Pt. Resting in bed comfortably. \    0630  Pt. Sleeping in between care. [id number] morphine  4 mg iv  Per MAR for pain management.

## 2017-09-26 NOTE — INTERVAL H&P NOTE
H&P Update:  Allison Del Rio was seen and examined. History and physical has been reviewed. The patient has been examined. There have been no significant clinical changes since the completion of the originally dated History and Physical.  Patient identified by surgeon; surgical site was confirmed by patient and surgeon.     Signed By: Malia Alvarado MD     September 26, 2017 10:44 AM

## 2017-09-27 LAB
ANION GAP SERPL CALC-SCNC: 8 MMOL/L (ref 3–18)
BASOPHILS # BLD: 0 K/UL (ref 0–0.06)
BASOPHILS NFR BLD: 0 % (ref 0–2)
BUN SERPL-MCNC: 8 MG/DL (ref 7–18)
BUN/CREAT SERPL: 9 (ref 12–20)
CALCIUM SERPL-MCNC: 8.4 MG/DL (ref 8.5–10.1)
CHLORIDE SERPL-SCNC: 107 MMOL/L (ref 100–108)
CO2 SERPL-SCNC: 25 MMOL/L (ref 21–32)
CREAT SERPL-MCNC: 0.9 MG/DL (ref 0.6–1.3)
DIFFERENTIAL METHOD BLD: ABNORMAL
EOSINOPHIL # BLD: 0.1 K/UL (ref 0–0.4)
EOSINOPHIL NFR BLD: 0 % (ref 0–5)
ERYTHROCYTE [DISTWIDTH] IN BLOOD BY AUTOMATED COUNT: 14 % (ref 11.6–14.5)
GLUCOSE SERPL-MCNC: 96 MG/DL (ref 74–99)
HCT VFR BLD AUTO: 34.7 % (ref 36–48)
HGB BLD-MCNC: 11.3 G/DL (ref 13–16)
LYMPHOCYTES # BLD: 1.7 K/UL (ref 0.9–3.6)
LYMPHOCYTES NFR BLD: 13 % (ref 21–52)
MCH RBC QN AUTO: 30.9 PG (ref 24–34)
MCHC RBC AUTO-ENTMCNC: 32.6 G/DL (ref 31–37)
MCV RBC AUTO: 94.8 FL (ref 74–97)
MONOCYTES # BLD: 1.6 K/UL (ref 0.05–1.2)
MONOCYTES NFR BLD: 12 % (ref 3–10)
NEUTS SEG # BLD: 10.3 K/UL (ref 1.8–8)
NEUTS SEG NFR BLD: 75 % (ref 40–73)
PLATELET # BLD AUTO: 324 K/UL (ref 135–420)
PMV BLD AUTO: 10.8 FL (ref 9.2–11.8)
POTASSIUM SERPL-SCNC: 3.6 MMOL/L (ref 3.5–5.5)
RBC # BLD AUTO: 3.66 M/UL (ref 4.7–5.5)
SODIUM SERPL-SCNC: 140 MMOL/L (ref 136–145)
VANCOMYCIN TROUGH SERPL-MCNC: 3 UG/ML (ref 10–20)
WBC # BLD AUTO: 13.7 K/UL (ref 4.6–13.2)

## 2017-09-27 PROCEDURE — 77030012935 HC DRSG AQUACEL BMS -B

## 2017-09-27 PROCEDURE — 74011250636 HC RX REV CODE- 250/636: Performed by: FAMILY MEDICINE

## 2017-09-27 PROCEDURE — 80202 ASSAY OF VANCOMYCIN: CPT | Performed by: SPECIALIST

## 2017-09-27 PROCEDURE — 74011250637 HC RX REV CODE- 250/637: Performed by: SPECIALIST

## 2017-09-27 PROCEDURE — 74011250636 HC RX REV CODE- 250/636: Performed by: INTERNAL MEDICINE

## 2017-09-27 PROCEDURE — 74011250636 HC RX REV CODE- 250/636: Performed by: PHYSICIAN ASSISTANT

## 2017-09-27 PROCEDURE — 36415 COLL VENOUS BLD VENIPUNCTURE: CPT | Performed by: INTERNAL MEDICINE

## 2017-09-27 PROCEDURE — 85025 COMPLETE CBC W/AUTO DIFF WBC: CPT | Performed by: INTERNAL MEDICINE

## 2017-09-27 PROCEDURE — 65270000029 HC RM PRIVATE

## 2017-09-27 PROCEDURE — 74011250637 HC RX REV CODE- 250/637: Performed by: INTERNAL MEDICINE

## 2017-09-27 PROCEDURE — 97116 GAIT TRAINING THERAPY: CPT

## 2017-09-27 PROCEDURE — 80048 BASIC METABOLIC PNL TOTAL CA: CPT | Performed by: INTERNAL MEDICINE

## 2017-09-27 PROCEDURE — 74011000258 HC RX REV CODE- 258: Performed by: PHYSICIAN ASSISTANT

## 2017-09-27 PROCEDURE — 97165 OT EVAL LOW COMPLEX 30 MIN: CPT

## 2017-09-27 PROCEDURE — 97535 SELF CARE MNGMENT TRAINING: CPT

## 2017-09-27 RX ORDER — HEPARIN SODIUM 5000 [USP'U]/ML
5000 INJECTION, SOLUTION INTRAVENOUS; SUBCUTANEOUS EVERY 8 HOURS
Status: DISCONTINUED | OUTPATIENT
Start: 2017-09-27 | End: 2017-09-29 | Stop reason: HOSPADM

## 2017-09-27 RX ORDER — KETOROLAC TROMETHAMINE 30 MG/ML
30 INJECTION, SOLUTION INTRAMUSCULAR; INTRAVENOUS
Status: COMPLETED | OUTPATIENT
Start: 2017-09-27 | End: 2017-09-27

## 2017-09-27 RX ORDER — CLONIDINE HYDROCHLORIDE 0.1 MG/1
0.1 TABLET ORAL 2 TIMES DAILY
Status: DISCONTINUED | OUTPATIENT
Start: 2017-09-27 | End: 2017-09-29 | Stop reason: HOSPADM

## 2017-09-27 RX ADMIN — KETOROLAC TROMETHAMINE 30 MG: 30 INJECTION, SOLUTION INTRAMUSCULAR at 13:48

## 2017-09-27 RX ADMIN — Medication 10 ML: at 22:20

## 2017-09-27 RX ADMIN — DOCUSATE SODIUM 100 MG: 100 CAPSULE, LIQUID FILLED ORAL at 17:54

## 2017-09-27 RX ADMIN — OXYCODONE HYDROCHLORIDE AND ACETAMINOPHEN 1 TABLET: 5; 325 TABLET ORAL at 01:02

## 2017-09-27 RX ADMIN — SODIUM CHLORIDE 1000 MG: 900 INJECTION, SOLUTION INTRAVENOUS at 22:21

## 2017-09-27 RX ADMIN — CLONIDINE HYDROCHLORIDE 0.1 MG: 0.1 TABLET ORAL at 11:50

## 2017-09-27 RX ADMIN — NEOMYCIN SULFATE, POLYMYXIN B SULFATE, HYDROCORTISONE 4 DROP: 3.5; 10000; 1 SOLUTION/ DROPS AURICULAR (OTIC) at 13:48

## 2017-09-27 RX ADMIN — HYDRALAZINE HYDROCHLORIDE 10 MG: 20 INJECTION INTRAMUSCULAR; INTRAVENOUS at 22:19

## 2017-09-27 RX ADMIN — NEOMYCIN SULFATE, POLYMYXIN B SULFATE, HYDROCORTISONE 3 DROP: 3.5; 10000; 1 SOLUTION/ DROPS AURICULAR (OTIC) at 17:59

## 2017-09-27 RX ADMIN — CEFEPIME HYDROCHLORIDE 2 G: 2 INJECTION, POWDER, FOR SOLUTION INTRAVENOUS at 04:26

## 2017-09-27 RX ADMIN — HYDRALAZINE HYDROCHLORIDE 10 MG: 20 INJECTION INTRAMUSCULAR; INTRAVENOUS at 08:20

## 2017-09-27 RX ADMIN — DOCUSATE SODIUM 100 MG: 100 CAPSULE, LIQUID FILLED ORAL at 08:14

## 2017-09-27 RX ADMIN — Medication 10 ML: at 13:49

## 2017-09-27 RX ADMIN — Medication 10 ML: at 04:26

## 2017-09-27 RX ADMIN — NEOMYCIN SULFATE, POLYMYXIN B SULFATE, HYDROCORTISONE 4 DROP: 3.5; 10000; 1 SOLUTION/ DROPS AURICULAR (OTIC) at 08:15

## 2017-09-27 RX ADMIN — NEOMYCIN SULFATE, POLYMYXIN B SULFATE, HYDROCORTISONE 3 DROP: 3.5; 10000; 1 SOLUTION/ DROPS AURICULAR (OTIC) at 22:20

## 2017-09-27 RX ADMIN — SODIUM CHLORIDE 1000 MG: 900 INJECTION, SOLUTION INTRAVENOUS at 08:15

## 2017-09-27 RX ADMIN — OXYCODONE HYDROCHLORIDE AND ACETAMINOPHEN 1 TABLET: 5; 325 TABLET ORAL at 08:14

## 2017-09-27 RX ADMIN — OXYCODONE HYDROCHLORIDE AND ACETAMINOPHEN 1 TABLET: 5; 325 TABLET ORAL at 11:50

## 2017-09-27 RX ADMIN — OXYCODONE HYDROCHLORIDE AND ACETAMINOPHEN 1 TABLET: 5; 325 TABLET ORAL at 17:54

## 2017-09-27 RX ADMIN — OXYCODONE HYDROCHLORIDE AND ACETAMINOPHEN 1 TABLET: 5; 325 TABLET ORAL at 22:20

## 2017-09-27 RX ADMIN — CLONIDINE HYDROCHLORIDE 0.1 MG: 0.1 TABLET ORAL at 17:54

## 2017-09-27 RX ADMIN — OXYCODONE HYDROCHLORIDE AND ACETAMINOPHEN 1 TABLET: 5; 325 TABLET ORAL at 04:26

## 2017-09-27 NOTE — PROGRESS NOTES
[facility]  [address], [facility] [facility]  [phone] Office  [phone] Fax                  Orthopaedic Surgery Daily Progress Note      Subjective: 43 y.o., male, 1 Day Post-Op, Procedure(s):  KNEE ARTHROSCOPY/INCISION AND DRAINAGE WITH WASHOUT     [x]I have reviewed the flowsheet and previous days notes. Events overnight reviewed and discussed with nursing staff. Vital signs and records reviewed.     Vital Signs:  Visit Vitals    BP (!) 150/96    Pulse 96    Temp 97.5 °F (36.4 °C)    Resp 16    Ht 5' 5\" (1.651 m)    Wt 170 lb (77.1 kg)    SpO2 99%    BMI 28.29 kg/m2       Pain Scale:    3/10 AT REST    6/10 With ACTIVITY    [  ] Ronald Shin, [  Luvenia Best, [  Tip Mor, [  ]Shooting, [  ] Osiel Pin, [  ] Electrical     [XX]Knee         [XX]Right   [  ] Left  [  ] Hip           [  Vanessa Apollo  [  ] Left  [  ] Daya Cope        [  Vanessa Apollo  [  ] Left  [  ] Wrist        [  Vanessa Apollo  [  ] Left  [  ] Vani Horn       [  Vanessa Apollo  [  ] Left  [  ] Shoulder  [  Vanessa Apollo  [  ] Left  [  ] Ankle       [  Vanessa Apollo   [  ] Left   [  ] Kari Heater          [  Vanessa Apollo  [  ] Left     [  ] with radiation to:   [XX] without radiation    Critical / Reportable event(s) occurring  past 24 hours: None      Review of Systems:      Pain as above,   [No] CP, [No] SOB, [No] F/C/NS, [No] Headache,  [No]Cough (  ) Productive / (   ) Non Productive  [No] dizziness, (+) voiding [No] matt, [No] Stool, (+) Flatus  [No] numbness / tingling, [No] double nor blurry vision    No results found for: SDES Lab Results   Component Value Date/Time    Culture result: MODERATE STAPHYLOCOCCUS AUREUS 09/26/2017 11:00 AM    Culture result: CULTURE IN Lupis Stevenson UPDATES TO FOLLOW 09/26/2017 11:00 AM    Culture result: NO GROWTH AFTER 23 HOURS 09/26/2017 07:36 AM    Culture result: NO GROWTH 2 DAYS 09/25/2017 08:10 PM    Culture result: NO GROWTH 2 DAYS 09/25/2017 07:55 PM        Labs:  Recent Results (from the past 24 hour(s)) CBC WITH AUTOMATED DIFF    Collection Time: 09/27/17  2:12 AM   Result Value Ref Range    WBC 13.7 (H) 4.6 - 13.2 K/uL    RBC 3.66 (L) 4.70 - 5.50 M/uL    HGB 11.3 (L) 13.0 - 16.0 g/dL    HCT 34.7 (L) 36.0 - 48.0 %    MCV 94.8 74.0 - 97.0 FL    MCH 30.9 24.0 - 34.0 PG    MCHC 32.6 31.0 - 37.0 g/dL    RDW 14.0 11.6 - 14.5 %    PLATELET 788 627 - 099 K/uL    MPV 10.8 9.2 - 11.8 FL    NEUTROPHILS 75 (H) 40 - 73 %    LYMPHOCYTES 13 (L) 21 - 52 %    MONOCYTES 12 (H) 3 - 10 %    EOSINOPHILS 0 0 - 5 %    BASOPHILS 0 0 - 2 %    ABS. NEUTROPHILS 10.3 (H) 1.8 - 8.0 K/UL    ABS. LYMPHOCYTES 1.7 0.9 - 3.6 K/UL    ABS. MONOCYTES 1.6 (H) 0.05 - 1.2 K/UL    ABS. EOSINOPHILS 0.1 0.0 - 0.4 K/UL    ABS. BASOPHILS 0.0 0.0 - 0.06 K/UL    DF AUTOMATED     METABOLIC PANEL, BASIC    Collection Time: 09/27/17  2:12 AM   Result Value Ref Range    Sodium 140 136 - 145 mmol/L    Potassium 3.6 3.5 - 5.5 mmol/L    Chloride 107 100 - 108 mmol/L    CO2 25 21 - 32 mmol/L    Anion gap 8 3.0 - 18 mmol/L    Glucose 96 74 - 99 mg/dL    BUN 8 7.0 - 18 MG/DL    Creatinine 0.90 0.6 - 1.3 MG/DL    BUN/Creatinine ratio 9 (L) 12 - 20      GFR est AA >60 >60 ml/min/1.73m2    GFR est non-AA >60 >60 ml/min/1.73m2    Calcium 8.4 (L) 8.5 - 10.1 MG/DL       Procedure(s): Wound site(s) examined:   [No] surgical dressing, [Nor] splint/brace, [were] removed    Objective findings / Data:    Patient is awake, pleasant, and cooperative sociable and oriented  x 3, sitting up in bed, right knee sx site intact bulky sx dressing covering, , skin staples not visualized due to cover dressing, no calf pain nor evidence DVT Mark LE, distal NVI fully Mark LE, cap refill < 2 sec Mark LE.     DIAGNOSIS / IMPRESSIONS:    1 Day Post-Op, Procedure(s):  KNEE ARTHROSCOPY/INCISION AND DRAINAGE WITH WASHOUT - stable  Post Operative / Post Procedural Pain -  on opiate analgesics  Post Procedure / Operative Range of Motion - working with PT/OT per protocol  Anemia - [  ] Chronic, [XX] Acute Post Op Blood Loss    Patient Active Problem List   Diagnosis Code    Septic arthritis of knee, right ([facility]) M00.9    HTN (hypertension) I10    ARF (acute renal failure) ([facility]) N17.9    Cellulitis of right knee L03.115            Plan:    1. Justification for continued stay: Fair progression towards established rehabilitation goals. 2. Medical Issues being followed closely:   [X] Fall and safety precautions    GradyYoungSalma ] Wound Care per protocol   [X] Bowel and Bladder Function    [X] Fluid Electrolyte and Nutrition Balance    [X] Pain Control per protocol   3. Issues that 24 hour nursing is following:   [X] Fall and safety precautions    [X] Wound Care    [X] Bowel and Bladder Function    [X] Fluid Electrolyte and Nutrition Balance    [X] Pain Control    [X] Assistance with and education on in-room safety with transfers to and from the bed, wheelchair, toilet and shower. 4. Plan of care:   [X] Continue current care    [X] Physical Therapy    [X] Occupational Therapy    [X] Thrombo Embolism Prophylaxis     [X] Discharge planning per patient protocol  [   ] Discharge / Transfer from Acute Stay [facility] to [  ] Home,  [  ] SNF   5. Medications:   [X] MAR Reviewed    [X] Continue Present Medications      6. DVT Prophylaxis:   [XX] Lovenox    [ ] Unfractionated Heparin    [ ] Coumadin    [ ] Xarelto   [ Allean Poplar  [ ] Eliquis   [ ] CARMENCITA Stockings    [ ] Sequential Compression Device(s): [  ]Full calf  [  ]Foot only   [XX] Ambulation        Orthopaedically, this patient is stable and their recovery is on track for a successful outcome per established rehabilitation goals. [No] Additional Orthopaedic surgical intervention(s) are warranted /  indicated at this time. Medicine, will continue to follow the patient as necessary. Thromboembolism prophylaxis will continue per protocol and noted above.       Physical and Occupational therapies will continue per protocol(s):    [ ] Full WBAT, [XX] Partial WBAT, [ ] Toetouch WB, [ ] Non Weight Bearing:     [ ] RUE [XX] RLE  [ ] LUE  [ ] LLE    ================================================================  [Yes]Patient administered Vancomycin IVPB Pre-operatively due to high risk        Methicillin Resistant Staph Aureus (MRSA) in local population. I have independently examined the patient and reviewed all lab studies and imgaing as well as review of nursing notes and physican notes from the past 24 hours. The plan of care has been discussed with the following below and all questions are answered. Case and finding(s) discussed with attending Orthopaedic Surgeon and:    [  Manuel Sierra and or attending Speciatist  [XX]Patient   [  ]Family   [XX]Nursing   [  ]Case Management      Medical Decision Making     Comprehensive review of patients current presenting condition(s) with a thorough review of the patient's previously known comorbidities:    Patient Active Problem List   Diagnosis Code    Septic arthritis of knee, right ([address] M00.9    HTN (hypertension) I10    ARF (acute renal failure) ([address] N17.9    Cellulitis of right knee L03.115           Per [employer] Protocol this case was discussed with attending surgeon, and reflects the attending surgeon's agreement with orders and (plan of care)  as confirmed by their cosignature.            Marivel Prieto, APA, APC, MPAS,  Orthopaedic Surgical Physician Assistant-C  Department of [facility]       9/27/2017  1:00 PM

## 2017-09-27 NOTE — CONSULTS
Infectious Disease Consultation Note    Requested by: dr. Milton turner    Reason: right knee septic arthritis    Current abx Prior abx   Cefepime, vancomycin since 9/25/17      Lines:       Assessment :    43 y.o.  male with no significant past medical history admitted to [facility] on 9/25/17 with increasing right knee pain. Clinical picture consistent with right knee/leg cellulitis, right knee septic arthritis. Patient has been appropriately managed with I&D, partial synovectomy on 9/26/17. Intra op cultures: pending. Exact microbial etiology of infection not known. However, history and presentation is suggestive of gram positive bacterial infection such as streptococcus and staphylococcus. Hence, will de escalate antibiotics while awaiting cultures. Recommendations:    1. D/c cefepime. Continue vancomycin  2. F/u intra op cultures  3. Will finalize abx based on cultures, clinical course    Advance Care planning: full code: discussed  with patient/surrogate decision maker: Jazz Gonzalez: [phone]    Thank you for consultation request. Above plan was discussed in details with patient, and dr Jermaine Mayes. Please call me if any further questions or concerns. Will continue to participate in the care of this patient. HPI:    43 y.o.  male with no significant past medical history admitted to [facility] on 9/25/17 with increasing right knee pain. Pt states that he had a boil on the right knee which he popped with some purulent drainage 1 week ago. At the same time he had boil in his left groin which resolved with alcohol and warm water soaks. He notes that he rubbed it with alcohol. On 9/24/17 evening, the right knee and lower right leg began to be stiff and achy which has progressively worsened. He presented to ER for evaluation. Upon arrival he was hypertensive 187/97. Labs were sig for mild leukocytosis and elevated Cr=1.5 (no baseline recorded).  XR R knee was sig for \"Hyperextension of the knee limits evaluation for suprapatellar joint effusion given the reported history of suspected septic arthritis. There is no definite bony erosion. No definite acute fracture. \" Ortho was consulted. Patient underwent I&D right knee on 9/26/17. Intraoperatively there was synovitis throughout the suprapatellar pouch along the anterior joint line. Patient denies fever, chills, headaches, visual disturbances, sore throat, runny nose, earaches, cp, sob, chills, cough, abdominal pain, diarrhea, burning micturition, or weakness in extremities. He denies back pain/flank pain. No known h/o MRSA colonization or infection in the past.        History reviewed. No pertinent past medical history. History reviewed. No pertinent surgical history. home Medication List    Details   oxyCODONE-acetaminophen (PERCOCET) 5-325 mg per tablet Take 1 Tab by mouth every eight (8) hours as needed for Pain. Qty: 10 Tab, Refills: 0      neomycin-polymyxin-hydrocortisone (CORTISPORIN) otic solution Administer 3-4 Drops in right ear four (4) times daily.  For 10 days  Qty: 10 mL, Refills: 0             Current Facility-Administered Medications   Medication Dose Route Frequency    ondansetron (ZOFRAN) injection 4 mg  4 mg IntraVENous Q4H PRN    hydrALAZINE (APRESOLINE) 20 mg/mL injection 10 mg  10 mg IntraVENous Q6H PRN    neomycin-polymyxin-hydrocortisone (CORTISPORIN) 3.5-10,000-1 mg/mL-unit/mL-% otic solution 3-4 Drop  3-4 Drop Right Ear QID    sodium chloride (NS) flush 5-10 mL  5-10 mL IntraVENous Q8H    sodium chloride (NS) flush 5-10 mL  5-10 mL IntraVENous PRN    acetaminophen (TYLENOL) tablet 650 mg  650 mg Oral Q4H PRN    oxyCODONE-acetaminophen (PERCOCET) 5-325 mg per tablet 1 Tab  1 Tab Oral Q4H PRN    HYDROmorphone (PF) (DILAUDID) injection 1 mg  1 mg IntraVENous Q4H PRN    docusate sodium (COLACE) capsule 100 mg  100 mg Oral BID    vancomycin (VANCOCIN) 1,000 mg in 0.9% sodium chloride (MBP/ADV) 250 mL adv  1,000 mg IntraVENous Q8H    Vancomycin trough due prior to 15:00 dose on . Thanks! Other ONCE    cefepime (MAXIPIME) 2 g in 0.9% sodium chloride (MBP/ADV) 100 mL MBP  2 g IntraVENous Q8H       Allergies: Review of patient's allergies indicates no known allergies. History reviewed. No pertinent family history. Social History     Social History    Marital status:      Spouse name: N/A    Number of children: N/A    Years of education: N/A     Occupational History    Not on file. Social History Main Topics    Smoking status: Not on file    Smokeless tobacco: Not on file    Alcohol use Not on file    Drug use: Not on file    Sexual activity: Not on file     Other Topics Concern    Not on file     Social History Narrative    No narrative on file     History   Smoking Status    Not on file   Smokeless Tobacco    Not on file        Temp (24hrs), Av.6 °F (37 °C), Min:97.9 °F (36.6 °C), Max:99 °F (37.2 °C)    Visit Vitals    BP (!) 167/96    Pulse (!) 104    Temp 98.2 °F (36.8 °C)    Resp 17    Ht 5' 5\" (1.651 m)    Wt 77.1 kg (170 lb)    SpO2 99%    BMI 28.29 kg/m2       ROS: 12 point ROS obtained in details. Pertinent positives as mentioned in HPI,   otherwise negative    Physical Exam:    General: Well developed, well nourished male laying on the bed AAOx3 in no acute distress. General:   awake alert and oriented   HEENT:  Normocephalic, atraumatic, PERRL, EOMI, no scleral icterus or pallor; no conjunctival hemmohage;  nasal and oral mucous are moist and without evidence of lesions. No thrush. Neck supple, no bruits. Lymph Nodes:   no cervical, axillary or inguinal adenopathy   Lungs:   non-labored, bilaterally clear to auscultation- no crackles wheezes rales or rhonchi   Heart:  RRR, s1 and s2; no murmurs rubs or gallops, no edema, + pedal pulses   Abdomen:  soft, non-distended, active bowel sounds, no hepatomegaly, no splenomegaly. Non-tender Genitourinary:  deferred   Extremities:   no clubbing, cyanosis; right knee/leg tender to touch - dressing not opened;  muscle mass appropriate for age   Neurologic:  No gross focal sensory abnormalities; 5/5 muscle strength to upper and lower extremities. Speech appropriate. Cranial nerves intact                        Skin:  Multiple scars on both LE from healed lesions.     Back:  no spinal or paraspinal muscle tenderness or rigidity, no CVA tenderness     Psychiatric:  No suicidal or homicidal ideations, appropriate mood and affect         Labs: Results:   Chemistry Recent Labs      09/27/17   0212 09/26/17 0405 09/25/17 1955   GLU  96  100*  99   NA  140  139  140   K  3.6  3.6  3.7   CL  107  107  104   CO2  25  23  27   BUN  8  13  18   CREA  0.90  1.03  1.51*   CA  8.4*  8.3*  8.9   AGAP  8  9  9   BUCR  9*  13  12      CBC w/Diff Recent Labs      09/27/17   0212 09/26/17 0405  09/25/17 1955   WBC  13.7*  12.8  12.3   RBC  3.66*  3.87*  3.96*   HGB  11.3*  12.0*  12.3*   HCT  34.7*  36.2  36.9   PLT  324  301  314   GRANS  75*   --   72   LYMPH  13*   --   18*   EOS  0   --   1      Microbiology Recent Labs      09/26/17   1100  09/26/17   0736  09/25/17 2010 09/25/17 1955   CULT  PENDING  PENDING  NO GROWTH 2 DAYS  NO GROWTH 2 DAYS          RADIOLOGY:    All available imaging studies/reports in [facility] for this admission were reviewed    Dr. Zeferino Henry, Infectious Disease Specialist  [phone]  September 27, 2017  9:20 AM

## 2017-09-27 NOTE — PROGRESS NOTES
Problem: Mobility Impaired (Adult and Pediatric)  Goal: *Acute Goals and Plan of Care (Insert Text)  Physical Therapy Goals  Initiated 9/26/2017 and to be accomplished within 7 day(s)  1. Patient will move from supine to sit and sit to supine , scoot up and down and roll side to side in bed with modified independence. 2. Patient will transfer from bed to chair and chair to bed with modified independence using the least restrictive device. 3. Patient will perform sit to stand with modified independence. 4. Patient will ambulate with modified independence for >150 feet with the least restrictive device. 5. Patient will ascend/descend 2 stairs with 2 handrail(s) with modified independence. Outcome: Progressing Towards Goal  PHYSICAL THERAPY TREATMENT     Patient: Cristal Cabrera (21 y.o. male)  Date: 9/27/2017  Diagnosis: septic right knee  Septic arthritis of knee, right (HCC) Septic arthritis of knee, right (HCC)  Procedure(s) (LRB):  KNEE ARTHROSCOPY/INCISION AND DRAINAGE WITH WASHOUT (Right) 1 Day Post-Op  Precautions:     Chart, physical therapy assessment, plan of care and goals were reviewed. ASSESSMENT:  Pt found supine in bed willing to work with PT. Pt seen with OT to maximize safety of pt and staff. Pt present with increased impulsivity this tx and minimally responsive to VCs. Pt sat at EOB this tx and immediately stood without RW in SLS on left. Pt requires multiple cues from OT for proper dressing techniques, pt taking both hands of RW several times to place shorts and shirt while in SLS on left, slight LOB when donning shirts, requiring CG to recover. Pt ambulated into hallway this tx placing right foot onto floor, with minimal WB. As pt continues ambulation, he begins to present with NWB on right. Pt returned to b/s chair post ambulation and left with needs in reach and Dr Charmaine Joshi entering room to speak with pt. Needs left in reach.  Pt pushes RW away from him at one point this tx to attempt ambulating without RW. Balance significantly reduced when doing so. Pt declines to answer questions about pain this tx. Education: gait, transfers. Progression toward goals:  [ ]      Improving appropriately and progressing toward goals  [X]      Improving slowly and progressing toward goals  [ ]      Not making progress toward goals and plan of care will be adjusted       PLAN:  Patient continues to benefit from skilled intervention to address the above impairments. Continue treatment per established plan of care. Discharge Recommendations:  Home Health with 24 hour supervision / Rehab  Further Equipment Recommendations for Discharge:  rolling walker       SUBJECTIVE:   Patient stated in standing: Do me a favor, let's talk to the nurse to unhook this IV first before I walk.  Pt changing mind back an fourth about what he plans to do mobility wise and impulsively carrying out tasks without cueing. OBJECTIVE DATA SUMMARY:   Functional Mobility Training:  Bed Mobility:  Supine to Sit: Supervision  Transfers:  Sit to Stand: Contact guard assistance  Stand to Sit: Contact guard assistance  Balance:  Sitting: Intact  Standing: Impaired; With support  Standing - Static: Good  Standing - Dynamic : Fair  Ambulation/Gait Training:  Distance (ft): 70 Feet (ft)  Assistive Device: Walker, rolling;Gait belt  Ambulation - Level of Assistance: Contact guard assistance  Gait Abnormalities: Antalgic;Decreased step clearance  Base of Support: Narrowed; Shift to left  Speed/Genevieve: Slow;Pace decreased (<100 feet/min)     Pain:  Pre tx pain: not rated  Post tx pain: not rated  Pain Scale 1: Numeric (0 - 10)  Pain Intensity 1:  (not rated)  Activity Tolerance:   Fair  Please refer to the flowsheet for vital signs taken during this treatment.   After treatment:   [X] Patient left in no apparent distress sitting up in chair  [ ] Patient left in no apparent distress in bed  [X] Call bell left within reach  [ ] Nursing notified  [X] Caregiver present  [ ] Bed alarm activated      Zoë Cabrera PTA   Time Calculation: 25 mins     Mobility [id number] Current  CI= 1-19%. The severity rating is based on the Level of Assistance required for Functional Mobility and ADLs. Mobility   Goal  CI= 1-19%. The severity rating is based on the Level of Assistance required for Functional Mobility and ADLs.

## 2017-09-27 NOTE — PROGRESS NOTES
Problem: Self Care Deficits Care Plan (Adult)  Goal: *Acute Goals and Plan of Care (Insert Text)  Occupational Therapy Goals  Initiated 9/27/2017 within 7 day(s). 1. Patient will perform lower body bathing with supervision/set-up   2. Patient will perform lower body dressing with supervision/set-up  Outcome: Progressing Towards Goal  OCCUPATIONAL THERAPY EVALUATION     Patient: Shamika Pappas (43 y.o. male)  Date: 9/27/2017  Primary Diagnosis: septic right knee  Septic arthritis of knee, right (HCC)  Procedure(s) (LRB):  KNEE ARTHROSCOPY/INCISION AND DRAINAGE WITH WASHOUT (Right) 1 Day Post-Op   Precautions:   Fall (WB 50% to AT)      ASSESSMENT :  Based on the objective data described below, the patient was in bed upon arrival. Patient has [facility] BUE AROM and strength. Patient needed supervision during bed mobility. Patient has poor safety awareness and is impulsive. Patient needed CGA during functional mobility/simulated toilet transfer with rolling walker. Patient educated on adaptive strategies for LE dressing tasks; patient was able to meryl pants with CGA for balance and safety, using adaptive strategies. Patient needed min A to simulate doffing socks. Patient left comfortable in no distress in chair. Patient will benefit from skilled intervention to address the above impairments.   Patients rehabilitation potential is considered to be Good  Factors which may influence rehabilitation potential include:   [ ]             None noted  [ ]             Mental ability/status  [X]             Medical condition  [ ]             Home/family situation and support systems  [ ]             Safety awareness  [ ]             Pain tolerance/management  [ ]             Other:        PLAN :  Recommendations and Planned Interventions:  [X]               Self Care Training                  [X]        Therapeutic Activities  [X]               Functional Mobility Training    [ ]        Cognitive Retraining  [ ] Therapeutic Exercises           [ ]        Endurance Activities  [X]               Balance Training                   [ ]        Neuromuscular Re-Education  [ ]               Visual/Perceptual Training     [ ]   Home Safety Training  [X]               Patient Education                 [ ]        Family Training/Education  [ ]               Other (comment):     Frequency/Duration: Patient will be followed by occupational therapy 1-2 times per day/4-7 days per week to address goals. Discharge Recommendations: Home Health with supervision  Further Equipment Recommendations for Discharge: shower chair      Barriers to Learning/Limitations: yes;  physical  Compensate with: visual, verbal, tactile, kinesthetic cues/model       PATIENT COMPLEXITY      Eval Complexity: History: LOW Complexity : Brief history review ; Examination: LOW Complexity : 1-3 performance deficits relating to physical, cognitive , or psychosocial skils that result in activity limitations and / or participation restrictions ; Decision Making:LOW Complexity : No comorbidities that affect functional and no verbal or physical assistance needed to complete eval tasks  Assessment: Low Complexity       G-CODES:      Self Care  Current  CJ= 20-39%   Goal  CI= 1-19%. The severity rating is based on the Level of Assistance required for Functional Mobility and ADLs. SUBJECTIVE:   Patient stated I would like to get dressed.       OBJECTIVE DATA SUMMARY:   History reviewed. No pertinent past medical history. History reviewed. No pertinent surgical history. Prior Level of Function/Home Situation: Patient reported he was independent in basic self care tasks and functional mobility PTA.   Home Situation  Home Environment: Private residence  # Steps to Enter: 3  One/Two Story Residence: One story  Living Alone: Yes  Support Systems: Family member(s)  Patient Expects to be Discharged to[de-identified] Private residence  Current DME Used/Available at Home: None  Tub or Shower Type: Tub/Shower combination  [X]  Right hand dominant          [ ]  Left hand dominant  Cognitive/Behavioral Status:  Neurologic State: Alert  Orientation Level: Oriented X4  Cognition: Follows commands; Impulsive     Skin: Non skin changes noted     Edema: No edema noted     Vision/Perceptual:       Acuity: Within Defined Limits       Coordination:  Coordination: Within functional limits (BUEs)       Balance:  Sitting: Intact  Standing: Impaired; With support  Standing - Static: Good  Standing - Dynamic : Fair     Strength:  Strength: Within functional limits (BUEs)     Tone & Sensation:  Tone: Normal (BUEs)  Sensation: Intact (BUEs)     Range of Motion:  AROM: Within functional limits (BUEs)     Functional Mobility and Transfers for ADLs:  Bed Mobility:  Supine to Sit: Supervision  Scooting: Supervision  Transfers:  Sit to Stand: Contact guard assistance              Toilet Transfer : Contact guard assistance                 ADL Assessment:(clinical judgement)  Feeding: Independent     Oral Facial Hygiene/Grooming: Contact guard assistance     Bathing: Contact guard assistance;Minimum assistance     Upper Body Dressing: Supervision     Lower Body Dressing: Contact guard assistance;Minimum assistance     Toileting: Stand by assistance     Pain:  Pt reports 6/10 pain or discomfort prior to treatment. Pt reports 6/10 pain or discomfort post treatment. Activity Tolerance:  Good     Please refer to the flowsheet for vital signs taken during this treatment. After treatment:   [X] Patient left in no apparent distress sitting up in chair  [ ] Patient left in no apparent distress in bed  [X] Call bell left within reach  [ ] Nursing notified  [ ] Caregiver present  [ ] Bed alarm activated      COMMUNICATION/EDUCATION:   [ ] Home safety education was provided and the patient/caregiver indicated understanding. [X] Patient/family have participated as able in goal setting and plan of care.   [X] Patient/family agree to work toward stated goals and plan of care. [ ] Patient understands intent and goals of therapy, but is neutral about his/her participation. [ ] Patient is unable to participate in goal setting and plan of care.      Thank you for this referral.  Tico Toth, OTR/L  Time Calculation: 25 mins

## 2017-09-27 NOTE — PROGRESS NOTES
SUBJECTIVE:    Was talking over phone when I entered in room. Denies for chest and abdominal pain. No SOB or cough. Right knee pain present. States he does not want to walk on this leg at this point. OBJECTIVE:    Visit Vitals    BP (!) 167/96    Pulse (!) 104    Temp 98.2 °F (36.8 °C)    Resp 17    Ht 5' 5\" (1.651 m)    Wt 77.1 kg (170 lb)    SpO2 99%    BMI 28.29 kg/m2     HEENT: no icterus. Oral mucosa is moist  Neck: trachea is midline  CVS: RRR  RS: CTA bilaterally  GI: NT, ND, BS +  Extremities: Right knee dressing in situ  General: NAD, Awake  CNS: motor strength 5/5 in both UEs, LLE. Limited ROM in RLE due to pain and dressing. ASSESSMENT:    1. Septic right knee synovitis s/p Right knee arthroscopy, incision and  drainage with washout septic knee, partial arthroscopic synovectomy. 2. HTN  3. KARLEY, resolved  4. Leukocytosis     PLAN:    Cont antibiotic. Follow c/s  ID consult pending.    Cont current management   Add clonidine  Heparin for DVT prophylaxis  PT/OT can be started once cleared by ortho team  Possible discharge on Friday depending on his clinical course     CMP:   Lab Results   Component Value Date/Time     09/27/2017 02:12 AM    K 3.6 09/27/2017 02:12 AM     09/27/2017 02:12 AM    CO2 25 09/27/2017 02:12 AM    AGAP 8 09/27/2017 02:12 AM    GLU 96 09/27/2017 02:12 AM    BUN 8 09/27/2017 02:12 AM    CREA 0.90 09/27/2017 02:12 AM    GFRAA >60 09/27/2017 02:12 AM    GFRNA >60 09/27/2017 02:12 AM    CA 8.4 (L) 09/27/2017 02:12 AM     CBC:   Lab Results   Component Value Date/Time    WBC 13.7 (H) 09/27/2017 02:12 AM    HGB 11.3 (L) 09/27/2017 02:12 AM    HCT 34.7 (L) 09/27/2017 02:12 AM     09/27/2017 02:12 AM

## 2017-09-28 ENCOUNTER — HOME HEALTH ADMISSION (OUTPATIENT)
Dept: HOME HEALTH SERVICES | Facility: HOME HEALTH | Age: 42
End: 2017-09-28

## 2017-09-28 LAB
ALBUMIN SERPL-MCNC: 2.8 G/DL (ref 3.4–5)
ALBUMIN/GLOB SERPL: 0.7 {RATIO} (ref 0.8–1.7)
ALP SERPL-CCNC: 62 U/L (ref 45–117)
ALT SERPL-CCNC: 15 U/L (ref 16–61)
ANION GAP SERPL CALC-SCNC: 8 MMOL/L (ref 3–18)
AST SERPL-CCNC: 11 U/L (ref 15–37)
BACTERIA SPEC CULT: ABNORMAL
BACTERIA SPEC CULT: ABNORMAL
BASOPHILS # BLD: 0 K/UL (ref 0–0.1)
BASOPHILS NFR BLD: 0 % (ref 0–2)
BILIRUB SERPL-MCNC: 0.5 MG/DL (ref 0.2–1)
BUN SERPL-MCNC: 8 MG/DL (ref 7–18)
BUN/CREAT SERPL: 9 (ref 12–20)
CALCIUM SERPL-MCNC: 8.6 MG/DL (ref 8.5–10.1)
CHLORIDE SERPL-SCNC: 105 MMOL/L (ref 100–108)
CO2 SERPL-SCNC: 26 MMOL/L (ref 21–32)
CREAT SERPL-MCNC: 0.89 MG/DL (ref 0.6–1.3)
DIFFERENTIAL METHOD BLD: ABNORMAL
EOSINOPHIL # BLD: 0.1 K/UL (ref 0–0.4)
EOSINOPHIL NFR BLD: 1 % (ref 0–5)
ERYTHROCYTE [DISTWIDTH] IN BLOOD BY AUTOMATED COUNT: 13.7 % (ref 11.6–14.5)
GLOBULIN SER CALC-MCNC: 3.9 G/DL (ref 2–4)
GLUCOSE SERPL-MCNC: 84 MG/DL (ref 74–99)
GRAM STN SPEC: ABNORMAL
GRAM STN SPEC: ABNORMAL
HCT VFR BLD AUTO: 34.7 % (ref 36–48)
HGB BLD-MCNC: 11.4 G/DL (ref 13–16)
LYMPHOCYTES # BLD: 1.7 K/UL (ref 0.9–3.6)
LYMPHOCYTES NFR BLD: 17 % (ref 21–52)
MAGNESIUM SERPL-MCNC: 2.2 MG/DL (ref 1.6–2.6)
MCH RBC QN AUTO: 30.9 PG (ref 24–34)
MCHC RBC AUTO-ENTMCNC: 32.9 G/DL (ref 31–37)
MCV RBC AUTO: 94 FL (ref 74–97)
MONOCYTES # BLD: 1.2 K/UL (ref 0.05–1.2)
MONOCYTES NFR BLD: 12 % (ref 3–10)
NEUTS SEG # BLD: 6.9 K/UL (ref 1.8–8)
NEUTS SEG NFR BLD: 70 % (ref 40–73)
PLATELET # BLD AUTO: 349 K/UL (ref 135–420)
PMV BLD AUTO: 10.6 FL (ref 9.2–11.8)
POTASSIUM SERPL-SCNC: 3.6 MMOL/L (ref 3.5–5.5)
PROT SERPL-MCNC: 6.7 G/DL (ref 6.4–8.2)
RBC # BLD AUTO: 3.69 M/UL (ref 4.7–5.5)
SERVICE CMNT-IMP: ABNORMAL
SODIUM SERPL-SCNC: 139 MMOL/L (ref 136–145)
WBC # BLD AUTO: 9.8 K/UL (ref 4.6–13.2)

## 2017-09-28 PROCEDURE — 83735 ASSAY OF MAGNESIUM: CPT | Performed by: INTERNAL MEDICINE

## 2017-09-28 PROCEDURE — 74011250637 HC RX REV CODE- 250/637: Performed by: SPECIALIST

## 2017-09-28 PROCEDURE — 74011250636 HC RX REV CODE- 250/636: Performed by: FAMILY MEDICINE

## 2017-09-28 PROCEDURE — 85025 COMPLETE CBC W/AUTO DIFF WBC: CPT | Performed by: INTERNAL MEDICINE

## 2017-09-28 PROCEDURE — 97116 GAIT TRAINING THERAPY: CPT

## 2017-09-28 PROCEDURE — 74011250636 HC RX REV CODE- 250/636: Performed by: INTERNAL MEDICINE

## 2017-09-28 PROCEDURE — 97535 SELF CARE MNGMENT TRAINING: CPT

## 2017-09-28 PROCEDURE — 36415 COLL VENOUS BLD VENIPUNCTURE: CPT | Performed by: INTERNAL MEDICINE

## 2017-09-28 PROCEDURE — 65270000029 HC RM PRIVATE

## 2017-09-28 PROCEDURE — 74011250637 HC RX REV CODE- 250/637: Performed by: HOSPITALIST

## 2017-09-28 PROCEDURE — 80053 COMPREHEN METABOLIC PANEL: CPT | Performed by: INTERNAL MEDICINE

## 2017-09-28 RX ORDER — LINEZOLID 600 MG/1
600 TABLET, FILM COATED ORAL EVERY 12 HOURS
Qty: 20 TAB | Refills: 0 | Status: SHIPPED | OUTPATIENT
Start: 2017-09-28 | End: 2017-09-29

## 2017-09-28 RX ORDER — IBUPROFEN 200 MG
1 TABLET ORAL DAILY
Status: DISCONTINUED | OUTPATIENT
Start: 2017-09-29 | End: 2017-09-29 | Stop reason: HOSPADM

## 2017-09-28 RX ORDER — LINEZOLID 600 MG/1
600 TABLET, FILM COATED ORAL EVERY 12 HOURS
Qty: 80 TAB | Refills: 0 | Status: SHIPPED | OUTPATIENT
Start: 2017-09-28 | End: 2017-09-28

## 2017-09-28 RX ORDER — AMLODIPINE BESYLATE 5 MG/1
5 TABLET ORAL DAILY
Status: DISCONTINUED | OUTPATIENT
Start: 2017-09-28 | End: 2017-09-29 | Stop reason: HOSPADM

## 2017-09-28 RX ORDER — LINEZOLID 600 MG/1
600 TABLET, FILM COATED ORAL EVERY 12 HOURS
Qty: 60 TAB | Refills: 0 | Status: SHIPPED | OUTPATIENT
Start: 2017-09-28 | End: 2017-09-28

## 2017-09-28 RX ORDER — LORAZEPAM 0.5 MG/1
0.5 TABLET ORAL
Status: DISCONTINUED | OUTPATIENT
Start: 2017-09-28 | End: 2017-09-29 | Stop reason: HOSPADM

## 2017-09-28 RX ADMIN — HYDRALAZINE HYDROCHLORIDE 10 MG: 20 INJECTION INTRAMUSCULAR; INTRAVENOUS at 05:00

## 2017-09-28 RX ADMIN — OXYCODONE HYDROCHLORIDE AND ACETAMINOPHEN 1 TABLET: 5; 325 TABLET ORAL at 05:00

## 2017-09-28 RX ADMIN — Medication 10 ML: at 05:02

## 2017-09-28 RX ADMIN — SODIUM CHLORIDE 1000 MG: 900 INJECTION, SOLUTION INTRAVENOUS at 23:29

## 2017-09-28 RX ADMIN — Medication 10 ML: at 23:28

## 2017-09-28 RX ADMIN — OXYCODONE HYDROCHLORIDE AND ACETAMINOPHEN 1 TABLET: 5; 325 TABLET ORAL at 15:31

## 2017-09-28 RX ADMIN — OXYCODONE HYDROCHLORIDE AND ACETAMINOPHEN 1 TABLET: 5; 325 TABLET ORAL at 22:29

## 2017-09-28 RX ADMIN — SODIUM CHLORIDE 1000 MG: 900 INJECTION, SOLUTION INTRAVENOUS at 17:01

## 2017-09-28 RX ADMIN — NEOMYCIN SULFATE, POLYMYXIN B SULFATE, HYDROCORTISONE 4 DROP: 3.5; 10000; 1 SOLUTION/ DROPS AURICULAR (OTIC) at 23:29

## 2017-09-28 RX ADMIN — AMLODIPINE BESYLATE 5 MG: 5 TABLET ORAL at 15:31

## 2017-09-28 NOTE — PROGRESS NOTES
Problem: Self Care Deficits Care Plan (Adult)  Goal: *Acute Goals and Plan of Care (Insert Text)  Occupational Therapy Goals  Initiated 9/27/2017 within 7 day(s). 1. Patient will perform lower body bathing with supervision/set-up   2. Patient will perform lower body dressing with supervision/set-up   Outcome: Resolved/Met Date Met:  09/28/17  OCCUPATIONAL THERAPY TREATMENT/DISCHARGE     Patient: Naina Martinez (76 y.o. male)  Date: 9/28/2017  Diagnosis: septic right knee  Septic arthritis of knee, right (HCC) Septic arthritis of knee, right (HCC)  Procedure(s) (LRB):  KNEE ARTHROSCOPY/INCISION AND DRAINAGE WITH WASHOUT (Right) 2 Days Post-Op  Precautions: Fall (WB 50% to AT)  Chart, occupational therapy assessment, plan of care, and goals were reviewed. ASSESSMENT:  Pt supine upon entry, agreeable to participate in LB ADL training. Pt was able to doff/don socks and shoes w/Supervised A this session, requiring additional time and min VCs for technique. Pt also simulated std aspects of donning of underwear and LB bathing. Issued long handled sponge for the pt to maximize independence w/washing R foot, pt was educated on proper use of AE, pt verbalized and demonstrated understanding. Pt has met his acute OT goals, we will DC from OT at this time. Progression toward goals:  [X]          Improving appropriately and progressing toward goals  [ ]          Improving slowly and progressing toward goals  [ ]          Not making progress toward goals and plan of care will be adjusted       PLAN:  Patient will be discharged from occupational therapy at this time. Rationale for discharge:  [X] Goals Achieved  [ ] [place] [place]  [ ] Patient not participating in therapy  [ ] Other:  Discharge Recommendations:  Home Health  Further Equipment Recommendations for Discharge:  shower chair         G-CODES:      Self Care  Current  CI= 1-19%   Goal  CI= 1-19%   D/C  CI= 1-19%.   The severity rating is based on the Other Levels of Assistance for ADLs and functional mobility      SUBJECTIVE:   Patient stated I will be ok. I'll figure everything out.       OBJECTIVE DATA SUMMARY:   Cognitive/Behavioral Status:  Neurologic State: Alert  Orientation Level: Oriented X4  Cognition: Follows commands, Impulsive     Functional Mobility and Transfers for ADLs:              Bed Mobility:     Supine to Sit: Supervision              Transfers:  Sit to Stand: Stand-by asssistance   Balance:  Sitting: Intact  Standing: Impaired; With support  Standing - Static: Good  Standing - Dynamic : Fair  ADL Intervention:   Lower Body Bathing  Bathing Assistance: Supervision/set-up (simulated)  Lower Body : Supervision/set-up  Adaptive Equipment: Long handled sponge  Lower Body Dressing Assistance  Underpants: Supervision/set-up (simulated)  Socks: Supervision/set-up  Slip on Shoes with Back: Supervision/set-up  Pain:  Pt reports 0/10 pain or discomfort prior to treatment. Pt reports 0/10 pain or discomfort post treatment. Activity Tolerance:    Fair  Please refer to the flowsheet for vital signs taken during this treatment.   After treatment:   [ ]  Patient left in no apparent distress sitting up in chair  [X]  Patient left in no apparent distress in bed  [X]  Call bell left within reach  [X]  Nursing notified  [ ]  Caregiver present  [ ]  Bed alarm activated     Donald Butts, RAINES/L  Time Calculation: 11 mins

## 2017-09-28 NOTE — PROGRESS NOTES
Patient is alert and oriented x 4, incision clean dry and intact, pedal pulses present and palpable, lungs clear bilaterally, bowel sound active in all quadrants. Patient is voiding without difficulties, tolerating regular food well, no complain of nausea or vomiting. Patient denies any acute distress,SOB and chest pain. Patient in stable condition,vitals WNL, no apparent distress noted, no neurological deficit noted, instruct patient to use the ICS, patient demonstrate appropriately. Educate patient on fall precaution, and the need to call for assistance. Placed call bell within reach, will continue to monitor. Patient is very aggressive at times, he instructed for nurses to stop hourly rounds. He verbalized he doesn't want to be disturbed. Patient was educated on the important of hourly rounds. Patient continue to refused with some verbal aggression.

## 2017-09-28 NOTE — PROGRESS NOTES
Called Dr Real Kong and Tawanna Bunn and informed them both that patient refused morning scheduled vancomycin and morning medications. Informed patient that they will both be in to see him later today.

## 2017-09-28 NOTE — PROGRESS NOTES
Received call from lab that patient is positive for MRSA in the right knee. Called Dr Du Durham and notified him. He stated to make patient contact isolation.

## 2017-09-28 NOTE — PROGRESS NOTES
Problem: Mobility Impaired (Adult and Pediatric)  Goal: *Acute Goals and Plan of Care (Insert Text)  Physical Therapy Goals  Initiated 9/26/2017 and to be accomplished within 7 day(s)  1. Patient will move from supine to sit and sit to supine , scoot up and down and roll side to side in bed with modified independence. 2. Patient will transfer from bed to chair and chair to bed with modified independence using the least restrictive device. 3. Patient will perform sit to stand with modified independence. 4. Patient will ambulate with modified independence for >150 feet with the least restrictive device. 5. Patient will ascend/descend 2 stairs with 2 handrail(s) with modified independence. Outcome: Progressing Towards Goal  PHYSICAL THERAPY TREATMENT     Patient: Christiano Hallman (72 y.o. male)  Date: 9/28/2017  Diagnosis: septic right knee  Septic arthritis of knee, right (HCC) Septic arthritis of knee, right (HCC)  Procedure(s) (LRB):  KNEE ARTHROSCOPY/INCISION AND DRAINAGE WITH WASHOUT (Right) 2 Days Post-Op  Precautions: Fall (WB 50% to AT)   Chart, physical therapy assessment, plan of care and goals were reviewed. ASSESSMENT:  Pt found supine in bed willing to work with PT. Pt seen with OT to maximize safety of pt and staff. Pt again presents with impulsivity, standing without RW again, provided to pt for ambulation. Pt ambulated in hallway a shorter distance, but able to bear more weight through right LE this tx. Pt then donned / doffed socks and shoes with RAINES. Pt left sitting at EOB with needs in reach. Education: gait, transfers. Progression toward goals:  [ ]      Improving appropriately and progressing toward goals  [X]      Improving slowly and progressing toward goals  [ ]      Not making progress toward goals and plan of care will be adjusted       PLAN:  Patient continues to benefit from skilled intervention to address the above impairments.   Continue treatment per established plan of care.  Discharge Recommendations:  Home Health  Further Equipment Recommendations for Discharge:  rolling walker       SUBJECTIVE:   Patient stated I'm just going to go to the window. I've been up today.       OBJECTIVE DATA SUMMARY:   Critical Behavior:  Neurologic State: Alert  Orientation Level: Oriented X4  Cognition: Follows commands, Impulsive  Functional Mobility Training:  Bed Mobility:  Supine to Sit: Supervision  Transfers:  Sit to Stand: Stand-by asssistance  Stand to Sit: Stand-by asssistance  Balance:  Sitting: Intact  Standing: Impaired; With support  Standing - Static: Good  Standing - Dynamic : Fair  Ambulation/Gait Training:  Distance (ft): 35 Feet (ft)  Assistive Device: Walker, rolling  Ambulation - Level of Assistance: Stand-by asssistance  Gait Abnormalities: Antalgic;Decreased step clearance  Base of Support: Narrowed; Shift to left  Speed/Genevieve: Slow;Pace decreased (<100 feet/min)     Pain:  Pre tx pain: 0  Post tx pain: 0  Pain Scale 1: Numeric (0 - 10)  Pain Intensity 1: 0  Activity Tolerance:   Fair  Please refer to the flowsheet for vital signs taken during this treatment. After treatment:   [X] Patient left in no apparent distress sitting EOB  [ ] Patient left in no apparent distress in bed  [X] Call bell left within reach  [ ] Nursing notified  [ ] Caregiver present  [ ] Bed alarm activated      Barbra Hernandez PTA   Time Calculation: 11 mins     Mobility [id number] Current  CI= 1-19%. The severity rating is based on the Level of Assistance required for Functional Mobility and ADLs. Mobility   Goal  CI= 1-19%. The severity rating is based on the Level of Assistance required for Functional Mobility and ADLs.

## 2017-09-28 NOTE — PROGRESS NOTES
Patient education has been given and patient has agreed for  to draw Vanc trough and allow nurse to hang Vancomycin.

## 2017-09-28 NOTE — PROGRESS NOTES
SUBJECTIVE:    Pt walking in room with walker. Denies any pain. No SOB or cough. States he does not want IV Abx. Prefers PO Abx. OBJECTIVE:    Visit Vitals    BP (!) 164/92 (BP 1 Location: Right arm, BP Patient Position: At rest)    Pulse (!) 108    Temp 99.7 °F (37.6 °C)    Resp 18    Ht 5' 5\" (1.651 m)    Wt 77.1 kg (170 lb)    SpO2 97%    BMI 28.29 kg/m2     HEENT: no icterus. Oral mucosa is moist  Neck: trachea is midline  CVS: RRR  RS: CTA bilaterally  GI: NT, ND, BS +  Extremities: Right knee dressing in situ  General: NAD, Awake  CNS: motor strength 5/5 in both UEs, LLE. Limited ROM in RLE due to pain and dressing. ASSESSMENT:    1. MRSA Septic right knee synovitis s/p Right knee arthroscopy, incision and  drainage with washout septic knee, partial arthroscopic synovectomy. 2. HTN  3. KARLEY, resolved  4. Leukocytosis     PLAN:  D/w ID and ortho, agree with Zyvox at d/c. D/w in detail with pt including risks and benefits of using zyvox over vanco IV, he understands and wants only PO med's  Cont antibiotic per ID.   Follow c/s  Cont current management   Add Norvasc and clonidine  UDS  Heparin for DVT prophylaxis  PT/OT  Possible discharge in am with [facility] and indigent med's     CMP:   Lab Results   Component Value Date/Time     09/28/2017 02:52 AM    K 3.6 09/28/2017 02:52 AM     09/28/2017 02:52 AM    CO2 26 09/28/2017 02:52 AM    AGAP 8 09/28/2017 02:52 AM    GLU 84 09/28/2017 02:52 AM    BUN 8 09/28/2017 02:52 AM    CREA 0.89 09/28/2017 02:52 AM    GFRAA >60 09/28/2017 02:52 AM    GFRNA >60 09/28/2017 02:52 AM    CA 8.6 09/28/2017 02:52 AM    MG 2.2 09/28/2017 02:52 AM    ALB 2.8 (L) 09/28/2017 02:52 AM    TP 6.7 09/28/2017 02:52 AM    GLOB 3.9 09/28/2017 02:52 AM    AGRAT 0.7 (L) 09/28/2017 02:52 AM    SGOT 11 (L) 09/28/2017 02:52 AM    ALT 15 (L) 09/28/2017 02:52 AM     CBC:   Lab Results   Component Value Date/Time    WBC 9.8 09/28/2017 02:52 AM    HGB 11.4 (L) 09/28/2017 02:52 AM    HCT 34.7 (L) 09/28/2017 02:52 AM     09/28/2017 02:52 AM

## 2017-09-28 NOTE — PROGRESS NOTES
Bedside and Verbal shift change report given to ajith RN (oncoming nurse) by Attila Rendon RN (offgoing nurse). Report included the following information SBAR, Kardex, MAR and Recent Results. SITUATION:    Code Status: Full Code   Reason for Admission: septic right knee   Septic arthritis of knee, right [facility])    [facility] day: 2   Problem List:       Hospital Problems  Never Reviewed          Codes Class Noted POA    HTN (hypertension) ICD-10-CM: I10  ICD-9-CM: 401.9  9/26/2017 Yes        ARF (acute renal failure) ([facility] [address] ICD-10-CM: N17.9  ICD-9-CM: 584.9  9/26/2017 Yes        Cellulitis of right knee ICD-10-CM: T99.505  ICD-9-CM: 682.6  9/26/2017 Unknown        * (Principal)Septic arthritis of knee, right ([facility] [address] ICD-10-CM: M00.9  ICD-9-CM: 711.06  9/25/2017 Yes              BACKGROUND:    Past Medical History: History reviewed. No pertinent past medical history. Patient taking anticoagulants no     ASSESSMENT:    Changes in Assessment Throughout Shift: NO     Patient has Central Line: no Reasons if yes: NO   Patient has Joseph Cath: no Reasons if yes: NO      Last Vitals:     Vitals:    09/27/17 1150 09/27/17 1652 09/27/17 1754 09/27/17 2023   BP: (!) 150/96 (!) 149/98 (!) 149/98 (!) 159/93   Pulse: 96 98 98 (!) 109   Resp:  17 17   Temp:  97.4 °F (36.3 °C)  (!) 100.8 °F (38.2 °C)   SpO2:  98%  97%   Weight:       Height:            IV and DRAINS (will only show if present)   Peripheral IV 09/25/17 Right; Lower Antecubital-Site Assessment: Clean, dry, & intact     WOUND (if present)   Wound Type:  none, SURGICAL INCISION   Dressing present Dressing Present : No   Wound Concerns/Notes:  none     PAIN    Pain Assessment    Pain Intensity 1: 6 (09/27/17 1150)    Pain Location 1: Knee    Pain Intervention(s) 1: Medication (see MAR)    Patient Stated Pain Goal: 0  o Interventions for Pain:  none, MEDS  o Intervention effective: yes  o Time of last intervention: SEE MAR   o Reassessment Completed: yes  Last 3 Weights:  Last 3 Recorded Weights in this Encounter    09/25/17 2119   Weight: 77.1 kg (170 lb)     Weight change:      INTAKE/OUPUT    Current Shift:      Last three shifts: 09/26 0701 - 09/27 1900  In: 1600 [P.O.:800; I.V.:800]  Out: 2250 [Urine:2250]     LAB RESULTS     Recent Labs      09/27/17   0212  09/26/17   0405  09/25/17 1955   WBC  13.7*  12.8  12.3   HGB  11.3*  12.0*  12.3*   HCT  34.7*  36.2  36.9   PLT  324  301  314        Recent Labs      09/27/17 0212 09/26/17 0405 09/25/17 1955   NA  140  139  140   K  3.6  3.6  3.7   GLU  96  100*  99   BUN  8  13  18   CREA  0.90  1.03  1.51*   CA  8.4*  8.3*  8.9   INR   --    --   1.0       RECOMMENDATIONS AND DISCHARGE PLANNING     1. Pending tests/procedures/ Plan of Care or Other Needs: LABS     2. Discharge plan for patient and Needs/Barriers: HOME    3. Estimated Discharge Date: 9/29/17 Posted on Whiteboard in [address] [place] [address] Room: yes      4. The patient's care plan was reviewed with the oncoming nurse. \"HEALS\" SAFETY CHECK      Fall Risk    Total Score: 2    Safety Measures: Safety Measures: Bed in low position, Caregiver at bedside, Bed/Chair-Wheels locked, Call light within reach    A safety check occurred in the patient's room between off going nurse and oncoming nurse listed above.     The safety check included the below items  Area Items   H  High Alert Medications - Verify all high alert medication drips (heparin, PCA, etc.)   E  Equipment - Suction is set up for ALL patients (with yanker)  - Red plugs utilized for all equipment (IV pumps, etc.)  - WOWs wiped down at end of shift.  - Room stocked with oxygen, suction, and other unit-specific supplies   A  Alarms - Bed alarm is set for fall risk patients  - Ensure chair alarm is in place and activated if patient is up in a chair   L  Lines - Check IV for any infiltration  - Joseph bag is empty if patient has a Joseph   - Tubing and IV bags are labeled   S  Safety   - Room is clean, patient is clean, and equipment is clean. - Hallways are clear from equipment besides carts. - Fall bracelet on for fall risk patients  - Ensure room is clear and free of clutter  - Suction is set up for ALL patients (with scooter)  - Hallways are clear from equipment besides carts.    - Isolation precautions followed, supplies available outside room, sign posted     Kacey Packer RN

## 2017-09-28 NOTE — PROGRESS NOTES
Patient refusing nurse to hang morning scheduled vancomycin and scheduled morning medications. Patient is requesting to speak with Dr Real Kong and Dr Evan Oates immediately about his plan of care.

## 2017-09-28 NOTE — PROGRESS NOTES
Denies  pain  Denies N/V  Afebrile  Dressing, dry clean and intact R knee  Pedal pulses intact  Voiding  Ambulatory w/ walker   ICS in place

## 2017-09-28 NOTE — PROGRESS NOTES
Infectious Disease progress Note    Requested by: dr. Shani turner    Reason: right knee septic arthritis    Current abx Prior abx   Cefepime, vancomycin since 9/25/17      Lines:       Assessment :    43 y.o.  male with no significant past medical history admitted to [facility] on 9/25/17 with increasing right knee pain. Clinical picture consistent with right knee/leg cellulitis, right knee septic arthritis. Patient has been appropriately managed with I&D, partial synovectomy on 9/26/17. Intra op cultures: mrsa    Recommendations:    1. Continue vancomycin  2. Recommend outpatient iv antibiotics for 6 weeks. Discussed with patient. Patient states that it is impossible for him to do outpatient iv either at home or at infusion center because of his work. He will likely lose his job if his pursues iv abx. He was in tears while talking and requests oral antibiotics. Under these circumstances, I would recommend to treat him with oral linezolid till 11/4/17. Risk/benefits of this approach explained to patient. F/u with ortho as outpatient. 3. Needs weekly cbc while on linezolid since linezolid can cause cytopenias. D/w dr. Bina Horton - he will arrange for this. Advance Care planning: full code: discussed  with patient/surrogate decision maker: Kody Cantrellft: [phone]    Above plan was discussed in details with patient, and dr Tonia Nj. Please call me if any further questions or concerns. Will continue to participate in the care of this patient. subjective:    Feels better. Decreased right knee pain. Patient denies fever, chills, headaches, visual disturbances, sore throat, runny nose, earaches, cp, sob, chills, cough, abdominal pain, diarrhea, burning micturition, or weakness in extremities. He denies back pain/flank pain. History reviewed. No pertinent past medical history. History reviewed. No pertinent surgical history.     home Medication List    Details   oxyCODONE-acetaminophen (PERCOCET) 5-325 mg per tablet Take 1 Tab by mouth every eight (8) hours as needed for Pain. Qty: 10 Tab, Refills: 0      neomycin-polymyxin-hydrocortisone (CORTISPORIN) otic solution Administer 3-4 Drops in right ear four (4) times daily. For 10 days  Qty: 10 mL, Refills: 0             Current Facility-Administered Medications   Medication Dose Route Frequency    heparin (porcine) injection 5,000 Units  5,000 Units SubCUTAneous Q8H    cloNIDine HCl (CATAPRES) tablet 0.1 mg  0.1 mg Oral BID    ondansetron (ZOFRAN) injection 4 mg  4 mg IntraVENous Q4H PRN    hydrALAZINE (APRESOLINE) 20 mg/mL injection 10 mg  10 mg IntraVENous Q6H PRN    neomycin-polymyxin-hydrocortisone (CORTISPORIN) 3.5-10,000-1 mg/mL-unit/mL-% otic solution 3-4 Drop  3-4 Drop Right Ear QID    sodium chloride (NS) flush 5-10 mL  5-10 mL IntraVENous Q8H    sodium chloride (NS) flush 5-10 mL  5-10 mL IntraVENous PRN    acetaminophen (TYLENOL) tablet 650 mg  650 mg Oral Q4H PRN    oxyCODONE-acetaminophen (PERCOCET) 5-325 mg per tablet 1 Tab  1 Tab Oral Q4H PRN    HYDROmorphone (PF) (DILAUDID) injection 1 mg  1 mg IntraVENous Q4H PRN    docusate sodium (COLACE) capsule 100 mg  100 mg Oral BID    vancomycin (VANCOCIN) 1,000 mg in 0.9% sodium chloride (MBP/ADV) 250 mL adv  1,000 mg IntraVENous Q8H       Allergies: Review of patient's allergies indicates no known allergies. Temp (24hrs), Av.9 °F (37.2 °C), Min:97.4 °F (36.3 °C), Max:100.8 °F (38.2 °C)    Visit Vitals    BP (!) 165/95 (BP 1 Location: Right arm, BP Patient Position: At rest)    Pulse (!) 104    Temp 99.9 °F (37.7 °C)    Resp 17    Ht 5' 5\" (1.651 m)    Wt 77.1 kg (170 lb)    SpO2 96%    BMI 28.29 kg/m2       ROS: 12 point ROS obtained in details. Pertinent positives as mentioned in HPI,   otherwise negative    Physical Exam:    General: Well developed, well nourished male laying on the bed AAOx3 in no acute distress.     General:   awake alert and oriented   HEENT: Normocephalic, atraumatic, PERRL, EOMI, no scleral icterus or pallor; no conjunctival hemmohage;  nasal and oral mucous are moist and without evidence of lesions. No thrush. Neck supple, no bruits. Lymph Nodes:   no cervical, axillary or inguinal adenopathy   Lungs:   non-labored, bilaterally clear to auscultation- no crackles wheezes rales or rhonchi   Heart:  RRR, s1 and s2; no murmurs rubs or gallops, no edema, + pedal pulses   Abdomen:  soft, non-distended, active bowel sounds, no hepatomegaly, no splenomegaly. Non-tender   Genitourinary:  deferred   Extremities:   no clubbing, cyanosis; right knee/leg tender to touch - dressing not opened;  muscle mass appropriate for age   Neurologic:  No gross focal sensory abnormalities; 5/5 muscle strength to upper and lower extremities. Speech appropriate. Cranial nerves intact                        Skin:  Multiple scars on both LE from healed lesions.     Back:  no spinal or paraspinal muscle tenderness or rigidity, no CVA tenderness     Psychiatric:  No suicidal or homicidal ideations, appropriate mood and affect         Labs: Results:   Chemistry Recent Labs      09/28/17 0252 09/27/17 0212 09/26/17 0405   GLU  84  96  100*   NA  139  140  139   K  3.6  3.6  3.6   CL  105  107  107   CO2  26  25  23   BUN  8  8  13   CREA  0.89  0.90  1.03   CA  8.6  8.4*  8.3*   AGAP  8  8  9   BUCR  9*  9*  13   AP  62   --    --    TP  6.7   --    --    ALB  2.8*   --    --    GLOB  3.9   --    --    AGRAT  0.7*   --    --       CBC w/Diff Recent Labs      09/28/17 0252 09/27/17 0212 09/26/17 0405 09/25/17 1955   WBC  9.8  13.7*  12.8  12.3   RBC  3.69*  3.66*  3.87*  3.96*   HGB  11.4*  11.3*  12.0*  12.3*   HCT  34.7*  34.7*  36.2  36.9   PLT  349  324  301  314   GRANS  70  75*   --   72   LYMPH  17*  13*   --   18*   EOS  1  0   --   1      Microbiology Recent Labs      09/26/17   1100  09/26/17   0736  09/25/17 2010 09/25/17 1955   CULT  MODERATE **METHICILLIN RESISTANT STAPHYLOCOCCUS AUREUS  MRSA CALLED TO AND CORRECTLY REPEATED BY:  Saintclair Jamaica RN 5S 0845 09/28/17 TO [facility]    NO ANAEROBES ISOLATED 2 DAYS  NO GROWTH 2 DAYS  NO GROWTH 3 DAYS  NO GROWTH 3 DAYS          RADIOLOGY:    All available imaging studies/reports in [facility] care for this admission were reviewed    Dr. Zoila Barajas, Infectious Disease Specialist  [phone]  September 28, 2017  9:20 AM

## 2017-09-28 NOTE — PROGRESS NOTES
Patient refused for  to do blood drawn to check vancomycin trough, patient was educated several times on the important of complying with antibiotic treatment. Patient continue to refused, he instructed to be left alone. He verbalized, he does not want his IV site touch, flush and definitely no needle stick under any circumstance. Clinical coordinator Rafaela Sacks RN was call to talk to patient. Patient tent to be aggressive when ever patient teaching in progress. Patient refused 1600 vancomycin. He verbalized he doesn't care who nurse call, not even the president will make hime do that. Patient instructed to minimized nursing rounds. Do not disturb sign was placed at the door. Will plan care appropriately.

## 2017-09-28 NOTE — PROGRESS NOTES
[facility]  [address] [address] [facility]  [phone] Office  [phone] Fax                  Orthopaedic Surgery Daily Progress Note      Subjective: 43 y.o., male, 2 Day Post-Op, Procedure(s):  KNEE ARTHROSCOPY/INCISION AND DRAINAGE WITH WASHOUT     [x]I have reviewed the flowsheet and previous days notes. Events overnight reviewed and discussed with nursing staff. Vital signs and records reviewed.     Vital Signs:  Visit Vitals    BP (!) 164/92 (BP 1 Location: Right arm, BP Patient Position: At rest)    Pulse (!) 108    Temp 99.7 °F (37.6 °C)    Resp 18    Ht 5' 5\" (1.651 m)    Wt 170 lb (77.1 kg)    SpO2 97%    BMI 28.29 kg/m2       Pain Scale:    3/10 AT REST    6/10 With ACTIVITY    [XX] Dull, [  Pam , [  ]Stabbing, [  ]Shooting, [  ] Paola Show, [  ] Electrical     Barbar Barnard         [XX]Right   [  ] Left  [  ] Hip           [  Rachana Numbers  [  ] Left  [  ] Ginna [place]        [  Rachana Numbers  [  ] Left  [  ] Wrist        [  Rachana Numbers  [  ] Left  [  ] Ralph Love       [  Rachana Numbers  [  ] Left  [  ] Shoulder  [  Rachana Numbers  [  ] Left  [  ] Ankle       [  Rachana Numbers   [  ] Left   [  ] Thomasena Meckel          [  Rachana Numbers  [  ] Left     [  ] with radiation to:   [XX] without radiation    Critical / Reportable event(s) occurring  past 24 hours: MRSA (+)      Review of Systems:      Pain as above,   [No] CP, [No] SOB, [No] F/C/NS, [No] Headache,  [No]Cough (  ) Productive / (   ) Non Productive  [No] dizziness, (+) voiding [No] matt, [No] Stool, (+) Flatus  [No] numbness / tingling, [No] double nor blurry vision    No results found for: SDES Lab Results   Component Value Date/Time    Culture result:  2017 11:00 AM     MODERATE **METHICILLIN RESISTANT STAPHYLOCOCCUS AUREUS**    Culture result:  2017 11:00 AM     MRSA CALLED TO AND CORRECTLY REPEATED BY:  Luis Enrique Yancey RN 5S 0845 17 TO [facility]      Culture result: NO ANAEROBES ISOLATED 2 DAYS 2017 11:00 AM    Culture result: NO GROWTH 2 DAYS 09/26/2017 07:36 AM    Culture result: NO GROWTH 3 DAYS 09/25/2017 08:10 PM        Labs:  Recent Results (from the past 24 hour(s))   VANCOMYCIN, TROUGH    Collection Time: 09/27/17  9:15 PM   Result Value Ref Range    Vancomycin,trough 3.0 (L) 10.0 - 20.0 ug/mL   CBC WITH AUTOMATED DIFF    Collection Time: 09/28/17  2:52 AM   Result Value Ref Range    WBC 9.8 4.6 - 13.2 K/uL    RBC 3.69 (L) 4.70 - 5.50 M/uL    HGB 11.4 (L) 13.0 - 16.0 g/dL    HCT 34.7 (L) 36.0 - 48.0 %    MCV 94.0 74.0 - 97.0 FL    MCH 30.9 24.0 - 34.0 PG    MCHC 32.9 31.0 - 37.0 g/dL    RDW 13.7 11.6 - 14.5 %    PLATELET 446 750 - 910 K/uL    MPV 10.6 9.2 - 11.8 FL    NEUTROPHILS 70 40 - 73 %    LYMPHOCYTES 17 (L) 21 - 52 %    MONOCYTES 12 (H) 3 - 10 %    EOSINOPHILS 1 0 - 5 %    BASOPHILS 0 0 - 2 %    ABS. NEUTROPHILS 6.9 1.8 - 8.0 K/UL    ABS. LYMPHOCYTES 1.7 0.9 - 3.6 K/UL    ABS. MONOCYTES 1.2 0.05 - 1.2 K/UL    ABS. EOSINOPHILS 0.1 0.0 - 0.4 K/UL    ABS. BASOPHILS 0.0 0.0 - 0.1 K/UL    DF AUTOMATED     METABOLIC PANEL, COMPREHENSIVE    Collection Time: 09/28/17  2:52 AM   Result Value Ref Range    Sodium 139 136 - 145 mmol/L    Potassium 3.6 3.5 - 5.5 mmol/L    Chloride 105 100 - 108 mmol/L    CO2 26 21 - 32 mmol/L    Anion gap 8 3.0 - 18 mmol/L    Glucose 84 74 - 99 mg/dL    BUN 8 7.0 - 18 MG/DL    Creatinine 0.89 0.6 - 1.3 MG/DL    BUN/Creatinine ratio 9 (L) 12 - 20      GFR est AA >60 >60 ml/min/1.73m2    GFR est non-AA >60 >60 ml/min/1.73m2    Calcium 8.6 8.5 - 10.1 MG/DL    Bilirubin, total 0.5 0.2 - 1.0 MG/DL    ALT (SGPT) 15 (L) 16 - 61 U/L    AST (SGOT) 11 (L) 15 - 37 U/L    Alk. phosphatase 62 45 - 117 U/L    Protein, total 6.7 6.4 - 8.2 g/dL    Albumin 2.8 (L) 3.4 - 5.0 g/dL    Globulin 3.9 2.0 - 4.0 g/dL    A-G Ratio 0.7 (L) 0.8 - 1.7     MAGNESIUM    Collection Time: 09/28/17  2:52 AM   Result Value Ref Range    Magnesium 2.2 1.6 - 2.6 mg/dL       Procedure(s):  Wound site(s) examined:   [No] surgical dressing, [Nor] splint/brace, [were] removed    Objective findings / Data:    Patient is awake, pleasant, and cooperative sociable and oriented  x 3, sitting up in bed, right knee sx site intact bulky sx dressing covering, , skin staples not visualized due to cover dressing, no calf pain nor evidence DVT Mark LE, distal NVI fully Mark LE, cap refill < 2 sec Mark LE. DIAGNOSIS / IMPRESSIONS:    1 Day Post-Op, Procedure(s):  KNEE ARTHROSCOPY/INCISION AND DRAINAGE WITH WASHOUT - stable  Post Operative / Post Procedural Pain -  on opiate analgesics  Post Procedure / Operative Range of Motion - working with PT/OT per protocol  Anemia - [  ] Chronic, [XX] Acute Post Op Blood Loss    Patient Active Problem List   Diagnosis Code    Septic arthritis of knee, right ([facility]) M00.9    HTN (hypertension) I10    ARF (acute renal failure) ([facility]) N17.9    Cellulitis of right knee L03.115            Plan:    1. Justification for continued stay: Fair progression towards established rehabilitation goals. 2. Medical Issues being followed closely:   [X] Fall and safety precautions    Parasosh ] Wound Care per protocol   [X] Bowel and Bladder Function    [X] Fluid Electrolyte and Nutrition Balance    [X] Pain Control per protocol   3. Issues that 24 hour nursing is following:   [X] Fall and safety precautions    [X] Wound Care    [X] Bowel and Bladder Function    [X] Fluid Electrolyte and Nutrition Balance    [X] Pain Control    [X] Assistance with and education on in-room safety with transfers to and from the bed, wheelchair, toilet and shower. 4. Plan of care:   [X] Continue current care    [X] Physical Therapy    [X] Occupational Therapy    [X] Thrombo Embolism Prophylaxis     [X] Discharge planning per patient protocol  [   ] Discharge / Transfer from Acute Stay [facility] to [  ] Home,  [  ] SNF   5. Medications:   [X] MAR Reviewed    [X] Continue Present Medications      6.  DVT Prophylaxis:   [XX] Lovenox    [ ] Unfractionated Heparin    [ ] Coumadin [ ] Parrish Esquivel   [ Onesimo Guerrero  [ ] Eliquis   [ ] CARMENCITA Stockings    [ ] Sequential Compression Device(s): [  ]Full calf  [  ]Foot only   [XX] Ambulation        Orthopaedically, this patient is stable and their recovery is on track for a successful outcome per established rehabilitation goals. [No] Additional Orthopaedic surgical intervention(s) are warranted /  indicated at this time. Medicine, will continue to follow the patient as necessary. Thromboembolism prophylaxis will continue per protocol and noted above. Physical and Occupational therapies will continue per protocol(s):    [XX] Full WBAT, [] Partial WBAT, [ ] Toetouch WB, [ ] Non Weight Bearing:     [ ] RUE [XX] RLE  [ ] LUE  [ ] LLE    ================================================================  [Yes]Patient administered Vancomycin IVPB Pre-operatively due to high risk        Methicillin Resistant Staph Aureus (MRSA) in local population. I have independently examined the patient and reviewed all lab studies and imgaing as well as review of nursing notes and physican notes from the past 24 hours. The plan of care has been discussed with the following below and all questions are answered. Case and finding(s) discussed with attending Orthopaedic Surgeon, Dr. Naeem Isabel, Dr. Javan Cesar.  Sury Eden and:    [  Reed Mejias and or attending Speciatist  [XX]Patient   [  ]Family   [XX]Nursing   [  ]Case Management      Medical Decision Making     Comprehensive review of patients current presenting condition(s) with a thorough review of the patient's previously known comorbidities:    Patient Active Problem List   Diagnosis Code    Septic arthritis of knee, right ([address] M00.9    HTN (hypertension) I10    ARF (acute renal failure) ([address] N17.9    Cellulitis of right knee L03.115           Per Fili Avila Protocol this case was discussed with attending surgeon, and reflects the attending surgeon's agreement with orders and (plan of care)  as confirmed by their cosignature.            Monet Pineda, APA, APC, MPAS,  Orthopaedic Surgical Physician Assistant-C  Department of [facility]       9/28/2017  1:00 PM

## 2017-09-28 NOTE — PROGRESS NOTES
Care Management Interventions  PCP Verified by CM: Yes (FC packet provided)  Palliative Care Criteria Met (RRAT>21 & CHF Dx)?: No  Mode of Transport at Discharge: Self  Transition of Care Consult (CM Consult): [address]: Yes  MyChart Signup: No  Discharge Durable Medical Equipment: Yes (RW)  Health Maintenance Reviewed: Yes  Physical Therapy Consult: Yes  Occupational Therapy Consult: Yes  Speech Therapy Consult: No  Current Support Network: Lives Alone  Confirm Follow Up Transport: Self  Plan discussed with Pt/Family/Caregiver: Yes  Freedom of Choice Offered: Yes  Discharge Location  Discharge Placement: Home (home with [facility])     Patient 43years old male admitted to med surg with septic right knee. CM spoke to the patient, he is AAO x 4, open to conversation. He shares, he lives alone, address listed is his mailing address, he provided his contact # [phone]; he lives alone at the following address: [address] [place] [address], [address] [place], [place] [address]. Patient says, he just started new job and he makes around $ 250.00/wk. He does not have any DMEs and will need RW prior to dc. He states the need for assistance with meds cost, says, he might will not be able to afford pain medication either. Patient will be dc on [employer], will call [employer] assist.  Patient has [facility] order, he would like [facility] for services. Patient was referred to [facility].     3:36 PM CM called [employer] assist, patient was authorized ONLY for 30 days free medication supply. For additional 10 days, application must be filled by the patient and provider as well as letter of hardship provided to [employer] assist.     30 days Chelita Albrecht # [id number]                         ID# [id number]                        [facility] [employer] # [id number]    5:33 PM hardship letter and application forms signed by the attending MD and the patient faxed to [employer] assist for auth for additional 20 tabs.

## 2017-09-28 NOTE — HOME CARE
Rec HC order - spoke with pt and explained Homebound requirement - pt states he is homebound, and is not going to work - [facility] will follow for SN/Pt/OT/MSW - Yolanda Carrero RN

## 2017-09-29 VITALS
BODY MASS INDEX: 28.32 KG/M2 | RESPIRATION RATE: 18 BRPM | HEIGHT: 65 IN | WEIGHT: 170 LBS | TEMPERATURE: 97 F | OXYGEN SATURATION: 100 % | HEART RATE: 82 BPM | SYSTOLIC BLOOD PRESSURE: 148 MMHG | DIASTOLIC BLOOD PRESSURE: 93 MMHG

## 2017-09-29 PROCEDURE — 74011250636 HC RX REV CODE- 250/636: Performed by: FAMILY MEDICINE

## 2017-09-29 PROCEDURE — 74011250637 HC RX REV CODE- 250/637: Performed by: INTERNAL MEDICINE

## 2017-09-29 PROCEDURE — 74011250637 HC RX REV CODE- 250/637: Performed by: HOSPITALIST

## 2017-09-29 PROCEDURE — 74011250637 HC RX REV CODE- 250/637: Performed by: SPECIALIST

## 2017-09-29 RX ORDER — AMLODIPINE BESYLATE 10 MG/1
10 TABLET ORAL DAILY
Qty: 30 TAB | Refills: 0 | Status: SHIPPED | OUTPATIENT
Start: 2017-09-29

## 2017-09-29 RX ORDER — OXYCODONE AND ACETAMINOPHEN 5; 325 MG/1; MG/1
1 TABLET ORAL
Qty: 20 TAB | Refills: 0 | Status: SHIPPED | OUTPATIENT
Start: 2017-09-29

## 2017-09-29 RX ORDER — DOCUSATE SODIUM 100 MG/1
100 CAPSULE, LIQUID FILLED ORAL
Qty: 60 CAP | Refills: 0 | Status: SHIPPED | OUTPATIENT
Start: 2017-09-29 | End: 2017-12-28

## 2017-09-29 RX ORDER — LINEZOLID 600 MG/1
600 TABLET, FILM COATED ORAL EVERY 12 HOURS
Qty: 80 TAB | Refills: 0 | Status: SHIPPED
Start: 2017-09-29 | End: 2017-11-08

## 2017-09-29 RX ORDER — CLONIDINE HYDROCHLORIDE 0.1 MG/1
0.1 TABLET ORAL 2 TIMES DAILY
Qty: 60 TAB | Refills: 0 | Status: SHIPPED | OUTPATIENT
Start: 2017-09-29

## 2017-09-29 RX ADMIN — AMLODIPINE BESYLATE 5 MG: 5 TABLET ORAL at 09:30

## 2017-09-29 RX ADMIN — CLONIDINE HYDROCHLORIDE 0.1 MG: 0.1 TABLET ORAL at 09:30

## 2017-09-29 RX ADMIN — Medication 5 ML: at 06:00

## 2017-09-29 RX ADMIN — NEOMYCIN SULFATE, POLYMYXIN B SULFATE, HYDROCORTISONE 3 DROP: 3.5; 10000; 1 SOLUTION/ DROPS AURICULAR (OTIC) at 13:00

## 2017-09-29 RX ADMIN — OXYCODONE HYDROCHLORIDE AND ACETAMINOPHEN 1 TABLET: 5; 325 TABLET ORAL at 13:20

## 2017-09-29 NOTE — PROGRESS NOTES
Alert  Denies  pain  Denies N/V  Afebrile  Receiving  ABX eardrops   Dressing, dry clean and intact R knee  Pedal pulses intact  Voiding  Ambulatory w/ walker   ICS in place

## 2017-09-29 NOTE — PROGRESS NOTES
Per Ortho, stable Right knee x 2 arthroscopic sx sites, just lateral to lateral portal, 2cm ruptured fracture blister, no purulence, trace bleeding, no evidence cellulitis, guarded ROM 60 degrees flexion and -10 degrees extension. Soft dressing to anterior knee, change daily, WBAT RLE. F/U x 10 days. MRSA precautions.

## 2017-09-29 NOTE — PROGRESS NOTES
Patient will be provided with indigent meds at [place]. CM called [employer] assist to f/u on the progress for additional meds auth, spoke to Zachary Killian, he stated to contact them in 1-1.5 hrs. Will call later. 1:53 PM cm called [employer] assist to f/u on status of additional med auth. Spoke to a rep, she stated patient was authorized for additional assistance until 9/29/2018. Fax with confirmation received and provided to the patient with [employer] hard script; patient to fill up the prescription.

## 2017-09-29 NOTE — PROGRESS NOTES
Infectious Disease progress Note    Requested by: dr. Rosario turner    Reason: right knee septic arthritis    Current abx Prior abx   vancomycin since 9/25/17 Cefepime 9/25-9/27     Lines:       Assessment :    43 y.o.  male with no significant past medical history admitted to [facility] on 9/25/17 with increasing right knee pain. Clinical picture consistent with right knee/leg cellulitis, right knee septic arthritis. Patient has been appropriately managed with I&D, partial synovectomy on 9/26/17. Intra op cultures: mrsa    Recommendations:    1. Continue vancomycin  2. Recommend outpatient iv antibiotics for 6 weeks. Discussed with patient. Patient states that it is impossible for him to do outpatient iv either at home or at infusion center because of his work. He will likely lose his job if his pursues iv abx. He was in tears while talking and requests oral antibiotics. Under these circumstances, I would recommend to treat him with oral linezolid till 11/4/17. Risk/benefits of this approach explained to patient. F/u with ortho as outpatient. 3. Needs weekly cbc while on linezolid since linezolid can cause cytopenias. D/w dr. Juan Ramon Price - he will arrange for this. Advance Care planning: full code: discussed  with patient/surrogate decision maker: Ashley Colder: [phone]    Above plan was discussed in details with patient, and dr Sherry Melendez. Please call me if any further questions or concerns. Will continue to participate in the care of this patient. subjective:    Feels better. Decreased right knee pain. Patient denies fever, chills, headaches, visual disturbances, sore throat, runny nose, earaches, cp, sob, chills, cough, abdominal pain, diarrhea, burning micturition, or weakness in extremities. He denies back pain/flank pain. History reviewed. No pertinent past medical history. History reviewed. No pertinent surgical history.     home Medication List    Details oxyCODONE-acetaminophen (PERCOCET) 5-325 mg per tablet Take 1 Tab by mouth every eight (8) hours as needed for Pain. Qty: 10 Tab, Refills: 0      neomycin-polymyxin-hydrocortisone (CORTISPORIN) otic solution Administer 3-4 Drops in right ear four (4) times daily. For 10 days  Qty: 10 mL, Refills: 0             Current Facility-Administered Medications   Medication Dose Route Frequency    amLODIPine (NORVASC) tablet 5 mg  5 mg Oral DAILY    LORazepam (ATIVAN) tablet 0.5 mg  0.5 mg Oral Q6H PRN    nicotine (NICODERM CQ) 21 mg/24 hr patch 1 Patch  1 Patch TransDERmal DAILY    heparin (porcine) injection 5,000 Units  5,000 Units SubCUTAneous Q8H    cloNIDine HCl (CATAPRES) tablet 0.1 mg  0.1 mg Oral BID    ondansetron (ZOFRAN) injection 4 mg  4 mg IntraVENous Q4H PRN    hydrALAZINE (APRESOLINE) 20 mg/mL injection 10 mg  10 mg IntraVENous Q6H PRN    neomycin-polymyxin-hydrocortisone (CORTISPORIN) 3.5-10,000-1 mg/mL-unit/mL-% otic solution 3-4 Drop  3-4 Drop Right Ear QID    sodium chloride (NS) flush 5-10 mL  5-10 mL IntraVENous Q8H    sodium chloride (NS) flush 5-10 mL  5-10 mL IntraVENous PRN    acetaminophen (TYLENOL) tablet 650 mg  650 mg Oral Q4H PRN    oxyCODONE-acetaminophen (PERCOCET) 5-325 mg per tablet 1 Tab  1 Tab Oral Q4H PRN    HYDROmorphone (PF) (DILAUDID) injection 1 mg  1 mg IntraVENous Q4H PRN    docusate sodium (COLACE) capsule 100 mg  100 mg Oral BID    vancomycin (VANCOCIN) 1,000 mg in 0.9% sodium chloride (MBP/ADV) 250 mL adv  1,000 mg IntraVENous Q8H       Allergies: Review of patient's allergies indicates no known allergies. Temp (24hrs), Av.7 °F (37.1 °C), Min:97.5 °F (36.4 °C), Max:99.8 °F (37.7 °C)    Visit Vitals    /88 (BP 1 Location: Right arm, BP Patient Position: At rest)    Pulse 91    Temp 97.8 °F (36.6 °C)    Resp 18    Ht 5' 5\" (1.651 m)    Wt 77.1 kg (170 lb)    SpO2 98%    BMI 28.29 kg/m2       ROS: 12 point ROS obtained in details.  Pertinent positives as mentioned in HPI,   otherwise negative    Physical Exam:    General: Well developed, well nourished male laying on the bed AAOx3 in no acute distress. General:   awake alert and oriented   HEENT:  Normocephalic, atraumatic, PERRL, EOMI, no scleral icterus or pallor; no conjunctival hemmohage;  nasal and oral mucous are moist and without evidence of lesions. No thrush. Neck supple, no bruits. Lymph Nodes:   no cervical, axillary or inguinal adenopathy   Lungs:   non-labored, bilaterally clear to auscultation- no crackles wheezes rales or rhonchi   Heart:  RRR, s1 and s2; no murmurs rubs or gallops, no edema, + pedal pulses   Abdomen:  soft, non-distended, active bowel sounds, no hepatomegaly, no splenomegaly. Non-tender   Genitourinary:  deferred   Extremities:   no clubbing, cyanosis; right knee/leg tender to touch - dressing not opened;  muscle mass appropriate for age   Neurologic:  No gross focal sensory abnormalities; 5/5 muscle strength to upper and lower extremities. Speech appropriate. Cranial nerves intact                        Skin:  Multiple scars on both LE from healed lesions.     Back:  no spinal or paraspinal muscle tenderness or rigidity, no CVA tenderness     Psychiatric:  No suicidal or homicidal ideations, appropriate mood and affect         Labs: Results:   Chemistry Recent Labs      09/28/17 0252 09/27/17 0212   GLU  84  96   NA  139  140   K  3.6  3.6   CL  105  107   CO2  26  25   BUN  8  8   CREA  0.89  0.90   CA  8.6  8.4*   AGAP  8  8   BUCR  9*  9*   AP  62   --    TP  6.7   --    ALB  2.8*   --    GLOB  3.9   --    AGRAT  0.7*   --       CBC w/Diff Recent Labs      09/28/17 0252 09/27/17 0212   WBC  9.8  13.7*   RBC  3.69*  3.66*   HGB  11.4*  11.3*   HCT  34.7*  34.7*   PLT  349  324   GRANS  70  75*   LYMPH  17*  13*   EOS  1  0      Microbiology Recent Labs      09/26/17   1100  09/26/17   0736  09/25/17 2010 09/25/17   1955   CULT  MODERATE **METHICILLIN RESISTANT STAPHYLOCOCCUS AUREUS  MRSA CALLED TO AND CORRECTLY REPEATED BY:  Maryanne Nam RN 5S [id number] 09/28/17 TO [facility]    NO ANAEROBES ISOLATED 2 DAYS  NO GROWTH 2 DAYS  NO GROWTH 3 DAYS  NO GROWTH 3 DAYS          RADIOLOGY:    All available imaging studies/reports in [facility] for this admission were reviewed    Dr. Freddy Castañeda, Infectious Disease Specialist  [phone]  September 29, 2017  9:20 AM

## 2017-09-29 NOTE — HOME CARE
D/c noted for today [facility] will follow for SN/PT/OT/MSW -  - RUSSELL Waters RN

## 2017-09-29 NOTE — PROGRESS NOTES
Patient education given on MRSA, patient inquiring on MRSA, states he has been looking up information online.

## 2017-09-29 NOTE — DISCHARGE INSTRUCTIONS
Open Drainage of a Joint: What to Expect at [address] had surgery to clean out an infection in one or more of your joints. The doctor removed fluid and material from the joint. You may feel sore and have some swelling at the surgical site. Your doctor will give you specific instructions on when you can do your normal activities again, such as driving and going back to work. This care sheet gives you a general idea about how long it will take for you to recover. But each person recovers at a different pace. Follow the steps below to get better as quickly as possible. How can you care for yourself at home? Activity  · Rest when you feel tired. · You may be able to take showers, if your doctor says it is okay. · If you do have a drain, follow your doctor's instructions to empty and care for it. Diet  · You can eat your normal diet. If your stomach is upset, try bland, low-fat foods like plain rice, broiled chicken, toast, and yogurt. Medicines  · Your doctor will tell you if and when you can restart your medicines. He or she will also give you instructions about taking any new medicines. · If you take blood thinners, such as warfarin (Coumadin), clopidogrel (Plavix), or aspirin, be sure to talk to your doctor. He or she will tell you if and when to start taking those medicines again. Make sure that you understand exactly what your doctor wants you to do. · Be safe with medicines. Read and follow all instructions on the label. ¨ If the doctor gave you a prescription medicine for pain, take it as prescribed. ¨ If you are not taking a prescription pain medicine, ask your doctor if you can take an over-the-counter medicine. · If your doctor prescribed antibiotics, take them as directed. Do not stop taking them just because you feel better. You need to take the full course of antibiotics. Incision care  · You will have a dressing over the cut (incision).  A dressing helps the incision heal and protects it. Your doctor will tell you how to take care of this. · Wash the area daily with warm, soapy water, and pat it dry. Don't use hydrogen peroxide or alcohol. They can slow healing. · Your doctor will tell you when to come back if your wound dressing needs to be changed or removed. Exercise  · Your doctor will give you instructions on what activities are okay for you to do. Ice and elevation  · Put ice or a cold pack on your joint for 10 to 20 minutes at a time. Try to do this every 1 to 2 hours for the next 3 days (when you are awake) or until the swelling goes down. Put a thin cloth between the ice and your skin. · If possible, prop up the sore joint on a pillow when you ice it or anytime you sit or lie down during the next 3 days. Try to keep it above the level of your heart. This will help reduce swelling. Other instructions  · You may need to use crutches after surgery if the joint is used for walking. Use crutches for as long as your doctor tells you to. Your doctor will tell you when you can put weight on the joint. It may help to use a backpack or wear clothes with a lot of pockets to carry items. · You may wear a sling or brace if you had surgery on the upper part of your body. The doctor will tell you how long you need to wear a support device. Follow-up care is a key part of your treatment and safety. Be sure to make and go to all appointments, and call your doctor if you are having problems. It's also a good idea to know your test results and keep a list of the medicines you take. When should you call for help? Call 911 anytime you think you may need emergency care. For example, call if:  · You passed out (lost consciousness). · You have severe trouble breathing. · You have sudden chest pain and shortness of breath, or you cough up blood. Call your doctor now or seek immediate medical care if:  · You have pain that does not get better after you take pain medicine.   · You have loose stitches, or your incision comes open. · You have a drain, and it comes out. · You are bleeding through your dressing. A small amount of blood is normal.  · You have symptoms of a blood clot in your arm or leg (called a deep vein thrombosis). These may include:  ¨ Pain in the arm, calf, back of the knee, thigh, or groin. ¨ Redness and swelling in the arm, leg, or groin. · You have signs of infection, such as:  ¨ Increased pain, swelling, warmth, or redness. ¨ Red streaks leading from the incision. ¨ Pus draining from the incision. ¨ A fever. Watch closely for changes in your health, and be sure to contact your doctor if:  · You do not get better as expected. Where can you learn more? Go to http://yaya-soto.info/. Enter [id number] in the search box to learn more about \"Open Drainage of a Joint: What to Expect at Home. \"  Current as of: March 20, 2017  Content Version: 11.3  © 9805-0569 [employer]. Care instructions adapted under license by [employer] (which disclaims liability or warranty for this information). If you have questions about a medical condition or this instruction, always ask your healthcare professional. [address] any warranty or liability for your use of this information. Cellulitis: Care Instructions  Your Care Instructions    Cellulitis is a skin infection. It often occurs after a break in the skin from a scrape, cut, bite, or puncture, or after a rash. The doctor has checked you carefully, but problems can develop later. If you notice any problems or new symptoms, get medical treatment right away. Follow-up care is a key part of your treatment and safety. Be sure to make and go to all appointments, and call your doctor if you are having problems. It's also a good idea to know your test results and keep a list of the medicines you take. How can you care for yourself at home?   · Take your antibiotics as directed. Do not stop taking them just because you feel better. You need to take the full course of antibiotics. · Prop up the infected area on pillows to reduce pain and swelling. Try to keep the area above the level of your heart as often as you can. · If your doctor told you how to care for your wound, follow your doctor's instructions. If you did not get instructions, follow this general advice:  ¨ Wash the wound with clean water 2 times a day. Don't use hydrogen peroxide or alcohol, which can slow healing. ¨ You may cover the wound with a thin layer of petroleum jelly, such as Vaseline, and a nonstick bandage. ¨ Apply more petroleum jelly and replace the bandage as needed. · Be safe with medicines. Take pain medicines exactly as directed. ¨ If the doctor gave you a prescription medicine for pain, take it as prescribed. ¨ If you are not taking a prescription pain medicine, ask your doctor if you can take an over-the-counter medicine. To prevent cellulitis in the future  · Try to prevent cuts, scrapes, or other injuries to your skin. Cellulitis most often occurs where there is a break in the skin. · If you get a scrape, cut, mild burn, or bite, wash the wound with clean water as soon as you can to help avoid infection. Don't use hydrogen peroxide or alcohol, which can slow healing. · If you have swelling in your legs (edema), support stockings and good skin care may help prevent leg sores and cellulitis. · Take care of your feet, especially if you have diabetes or other conditions that increase the risk of infection. Wear shoes and socks. Do not go barefoot. If you have athlete's foot or other skin problems on your feet, talk to your doctor about how to treat them. When should you call for help? Call your doctor now or seek immediate medical care if:  · You have signs that your infection is getting worse, such as:  ¨ Increased pain, swelling, warmth, or redness.   ¨ Red streaks leading from the area.  ¨ Pus draining from the area. ¨ A fever. · You get a rash. Watch closely for changes in your health, and be sure to contact your doctor if:  · You are not getting better after 1 day (24 hours). · You do not get better as expected. Where can you learn more? Go to http://yaya-soto.info/. Cornelia Raygoza in the search box to learn more about \"Cellulitis: Care Instructions. \"  Current as of: October 13, 2016  Content Version: 11.3  © 2958-0544 "[employer]. Care instructions adapted under license by [employer] (which disclaims liability or warranty for this information). If you have questions about a medical condition or this instruction, always ask your healthcare professional. [address] any warranty or liability for your use of this information. High Blood Pressure: Care Instructions  Your Care Instructions  If your blood pressure is usually above 140/90, you have high blood pressure, or hypertension. That means the top number is 140 or higher or the bottom number is 90 or higher, or both. Despite what a lot of people think, high blood pressure usually doesn't cause headaches or make you feel dizzy or lightheaded. It usually has no symptoms. But it does increase your risk for heart attack, stroke, and kidney or eye damage. The higher your blood pressure, the more your risk increases. Your doctor will give you a goal for your blood pressure. Your goal will be based on your health and your age. An example of a goal is to keep your blood pressure below 140/90. Lifestyle changes, such as eating healthy and being active, are always important to help lower blood pressure. You might also take medicine to reach your blood pressure goal.  Follow-up care is a key part of your treatment and safety. Be sure to make and go to all appointments, and call your doctor if you are having problems.  It's also a good idea to know your test results and keep a list of the medicines you take. How can you care for yourself at home? Medical treatment  · If you stop taking your medicine, your blood pressure will go back up. You may take one or more types of medicine to lower your blood pressure. Be safe with medicines. Take your medicine exactly as prescribed. Call your doctor if you think you are having a problem with your medicine. · Talk to your doctor before you start taking aspirin every day. Aspirin can help certain people lower their risk of a heart attack or stroke. But taking aspirin isn't right for everyone, because it can cause serious bleeding. · See your doctor regularly. You may need to see the doctor more often at first or until your blood pressure comes down. · If you are taking blood pressure medicine, talk to your doctor before you take decongestants or anti-inflammatory medicine, such as ibuprofen. Some of these medicines can raise blood pressure. · Learn how to check your blood pressure at home. Lifestyle changes  · Stay at a healthy weight. This is especially important if you put on weight around the waist. Losing even 10 pounds can help you lower your blood pressure. · If your doctor recommends it, get more exercise. Walking is a good choice. Bit by bit, increase the amount you walk every day. Try for at least 30 minutes on most days of the week. You also may want to swim, bike, or do other activities. · Avoid or limit alcohol. Talk to your doctor about whether you can drink any alcohol. · Try to limit how much sodium you eat to less than 2,300 milligrams (mg) a day. Your doctor may ask you to try to eat less than 1,500 mg a day. · Eat plenty of fruits (such as bananas and oranges), vegetables, legumes, whole grains, and low-fat dairy products. · Lower the amount of saturated fat in your diet. Saturated fat is found in animal products such as milk, cheese, and meat.  Limiting these foods may help you lose weight and also lower your risk for heart disease. · Do not smoke. Smoking increases your risk for heart attack and stroke. If you need help quitting, talk to your doctor about stop-smoking programs and medicines. These can increase your chances of quitting for good. When should you call for help? Call 911 anytime you think you may need emergency care. This may mean having symptoms that suggest that your blood pressure is causing a serious heart or blood vessel problem. Your blood pressure may be over 180/110. For example, call 911 if:  · You have symptoms of a heart attack. These may include:  ¨ Chest pain or pressure, or a strange feeling in the chest.  ¨ Sweating. ¨ Shortness of breath. ¨ Nausea or vomiting. ¨ Pain, pressure, or a strange feeling in the back, neck, jaw, or upper belly or in one or both shoulders or arms. ¨ Lightheadedness or sudden weakness. ¨ A fast or irregular heartbeat. · You have symptoms of a stroke. These may include:  ¨ Sudden numbness, tingling, weakness, or loss of movement in your face, arm, or leg, especially on only one side of your body. ¨ Sudden vision changes. ¨ Sudden trouble speaking. ¨ Sudden confusion or trouble understanding simple statements. ¨ Sudden problems with walking or balance. ¨ A sudden, severe headache that is different from past headaches. · You have severe back or belly pain. Do not wait until your blood pressure comes down on its own. Get help right away. Call your doctor now or seek immediate care if:  · Your blood pressure is much higher than normal (such as 180/110 or higher), but you don't have symptoms. · You think high blood pressure is causing symptoms, such as:  ¨ Severe headache. ¨ Blurry vision. Watch closely for changes in your health, and be sure to contact your doctor if:  · Your blood pressure measures 140/90 or higher at least 2 times. That means the top number is 140 or higher or the bottom number is 90 or higher, or both.   · You think you may be having side effects from your blood pressure medicine. · Your blood pressure is usually normal, but it goes above normal at least 2 times. Where can you learn more? Go to http://yaya-soto.info/. Enter D684 in the search box to learn more about \"High Blood Pressure: Care Instructions. \"  Current as of: August 8, 2016  Content Version: 11.3  © 4274-9714 [employer]. Care instructions adapted under license by [employer] (which disclaims liability or warranty for this information). If you have questions about a medical condition or this instruction, always ask your healthcare professional. [place] [address] any warranty or liability for your use of this information. Acute Kidney Injury: Care Instructions  Your Care Instructions  Acute kidney injury is the sudden loss of kidney function that happens when the kidneys stop working over a period of hours, days or, in some cases, weeks. It is also known as acute renal failure. Common causes of acute kidney injury are dehydration, blood loss, and medicines. When acute kidney injury happens, the kidneys cannot remove waste and excess fluids from the body. The waste and fluids build up and become harmful. Kidney function may return to normal if the cause of acute kidney injury is treated quickly. Your chance of a full recovery depends on what caused the problem, how quickly the cause was treated, and what other medical problems you have. A machine may be used to help your kidneys remove waste and fluids for a short period of time. This is called dialysis. Follow-up care is a key part of your treatment and safety. Be sure to make and go to all appointments, and call your doctor if you are having problems. It's also a good idea to know your test results and keep a list of the medicines you take. How can you care for yourself at home? · Talk to your doctor about how much fluid you should drink.   · Eat a balanced diet. Talk to your doctor or a dietitian about what type of diet may be best for you. · Follow the instructions and schedule for dialysis that your doctor gives you. · Do not smoke. Smoking can make your condition worse. If you need help quitting, talk to your doctor about stop-smoking programs and medicines. These can increase your chances of quitting for good. · Do not drink alcohol. · Review all of your medicines with your doctor. Do not take any medicines, including nonsteroidal anti-inflammatory drugs (NSAIDs) such as ibuprofen (Advil, Motrin) or naproxen (Aleve), unless your doctor says it is safe for you to do so. · Make sure that anyone treating you for any health problem knows that you have had acute kidney injury. When should you call for help? Call 911 anytime you think you may need emergency care. For example, call if:  · You passed out (lost consciousness). · You have severe trouble breathing. Call your doctor now or seek immediate medical care if:  · You have less urine than normal or no urine. · You have trouble urinating or can urinate only very small amounts. · You are confused or have trouble thinking clearly. · You feel weaker or more tired than usual.  · You are very thirsty, lightheaded, or dizzy. · You have nausea and vomiting. · You have new swelling of your arms or feet, or your swelling is worse. · You have blood in your urine. · Your body weight goes up every day. · You have new or worse trouble breathing. Watch closely for changes in your health, and be sure to contact your doctor if:  · You do not get better as expected. Where can you learn more? Go to http://yaya-soto.info/. Enter L409 in the search box to learn more about \"Acute Kidney Injury: Care Instructions. \"  Current as of: April 3, 2017  Content Version: 11.3  © 9386-8734 [employer].  Care instructions adapted under license by [employer] (which disclaims liability or warranty for this information). If you have questions about a medical condition or this instruction, always ask your healthcare professional. [address] any warranty or liability for your use of this information. Septic Arthritis: Care Instructions  Your Care Instructions  Septic arthritis is a bacterial infection in a joint. This occurs when an infection from another part of the body, such as pneumonia or a skin or kidney infection, travels through the bloodstream to the joint. It may also spread to the joint from an infection in nearby soft tissue, or it can follow a surgery or injury. The joint is often warm, swollen, and tender. Early treatment can prevent permanent damage to the joint. Treatment includes antibiotics and draining the joint to remove the infection. Depending on which part of your body is infected, your doctor may drain the joint with a needle or you may need surgery to drain the joint. Follow-up care is a key part of your treatment and safety. Be sure to make and go to all appointments, and call your doctor if you are having problems. It's also a good idea to know your test results and keep a list of the medicines you take. How can you care for yourself at home? · You will receive antibiotics through a vein (IV) at first. After this, you may take antibiotics by mouth. · Take your antibiotics as directed. Do not stop taking them just because you feel better. You need to take the full course of antibiotics. · Rest the joint as much as you can. · If possible, prop up the injured joint on pillows as much as possible for the next 3 days. Try to keep it at or above the level of your heart. This can help reduce pain and swelling. · Follow your doctor's instructions on exercises for the affected joint. · Do not smoke. Smoking can make it harder for your body to fight the infection.  If you need help quitting, talk to your doctor about stop-smoking programs and medicines. These can increase your chances of quitting for good. When should you call for help? Call your doctor now or seek immediate medical care if:  · Signs of infection return or get worse. These include:  ¨ Increased pain, swelling, warmth, or redness. ¨ Red streaks leading from the joint. ¨ Pus draining from the site. ¨ A fever. ¨ Shaking chills. · You have trouble walking or using the joint. Watch closely for changes in your health, and be sure to contact your doctor if:  · After you finish treatment, you cannot move a joint as well as you could before the infection. · The affected limb seems shorter than it was before the infection. · You do not get better as expected. Where can you learn more? Go to http://yaya-soto.info/. Enter C265 in the search box to learn more about \"Septic Arthritis: Care Instructions. \"  Current as of: March 3, 2017  Content Version: 11.3  © 9226-9532 [employer]. Care instructions adapted under license by [employer] (which disclaims liability or warranty for this information). If you have questions about a medical condition or this instruction, always ask your healthcare professional. [place] [address] any warranty or liability for your use of this information. Patient armband removed and shredded    MyChart Activation    Thank you for requesting access to [facility]). Please follow the instructions below to securely access and download your online medical record. [facility]) allows you to send messages to your doctor, view your test results, renew your prescriptions, schedule appointments, and more. How Do I Sign Up? 1. In your internet browser, go to [email or web address]  2. Click on the First Time User? Click Here link in the Sign In box. You will be redirect to the New Member Sign Up page. 3. Enter your [facility]) Access Code exactly as it appears below.  You will not need to use this code after youve completed the sign-up process. If you do not sign up before the expiration date, you must request a new code. [facility] Access Code: [email or web address]  Expires: 2017  1:44 PM (This is the date your [facility] access code will )    4. Enter the last four digits of your Social Security Number (xxxx) and Date of Birth (mm/dd/yyyy) as indicated and click Submit. You will be taken to the next sign-up page. 5. Create a Windeln.det ID. This will be your [facility] login ID and cannot be changed, so think of one that is secure and easy to remember. 6. Create a [facility] password. You can change your password at any time. 7. Enter your Password Reset Question and Answer. This can be used at a later time if you forget your password. 8. Enter your e-mail address. You will receive e-mail notification when new information is available in [address]. 9. Click Sign Up. You can now view and download portions of your medical record. 10. Click the Download Summary menu link to download a portable copy of your medical information. Additional Information    If you have questions, please visit the Frequently Asked Questions section of the [employer] website at [email or web address] Remember, [facility] is NOT to be used for urgent needs. For medical emergencies, dial 911. DISCHARGE SUMMARY from Nurse    The following personal items are in your possession at time of discharge:    Dental Appliances: None  Visual Aid: None                            PATIENT INSTRUCTIONS:    After general anesthesia or intravenous sedation, for 24 hours or while taking prescription Narcotics:  · Limit your activities  · Do not drive and operate hazardous machinery  · Do not make important personal or business decisions  · Do  not drink alcoholic beverages  · If you have not urinated within 8 hours after discharge, please contact your surgeon on call.     Report the following to your surgeon:  · Excessive pain, swelling, redness or odor of or around the surgical area  · Temperature over 100.5  · Nausea and vomiting lasting longer than 4 hours or if unable to take medications  · Any signs of decreased circulation or nerve impairment to extremity: change in color, persistent  numbness, tingling, coldness or increase pain  · Any questions        What to do at Home:  Recommended activity: Activity as tolerated    If you experience any of the following symptoms Nausea, vomiting, diarrhea, fever greater than 100.5, dizziness, severe headache, shortness of breath, chest pain, increased pain, please follow up with PCP. *  Please give a list of your current medications to your Primary Care Provider. *  Please update this list whenever your medications are discontinued, doses are      changed, or new medications (including over-the-counter products) are added. *  Please carry medication information at all times in case of emergency situations. These are general instructions for a healthy lifestyle:    No smoking/ No tobacco products/ Avoid exposure to second hand smoke    Surgeon General's Warning:  Quitting smoking now greatly reduces serious risk to your health. Obesity, smoking, and sedentary lifestyle greatly increases your risk for illness    A healthy diet, regular physical exercise & weight monitoring are important for maintaining a healthy lifestyle    You may be retaining fluid if you have a history of heart failure or if you experience any of the following symptoms:  Weight gain of 3 pounds or more overnight or 5 pounds in a week, increased swelling in our hands or feet or shortness of breath while lying flat in bed. Please call your doctor as soon as you notice any of these symptoms; do not wait until your next office visit.     Recognize signs and symptoms of STROKE:    F-face looks uneven    A-arms unable to move or move unevenly    S-speech slurred or non-existent    T-time-call 911 as soon as signs and symptoms begin-DO NOT go       Back to bed or wait to see if you get better-TIME IS BRAIN. Warning Signs of HEART ATTACK     Call 911 if you have these symptoms:   Chest discomfort. Most heart attacks involve discomfort in the center of the chest that lasts more than a few minutes, or that goes away and comes back. It can feel like uncomfortable pressure, squeezing, fullness, or pain.  Discomfort in other areas of the upper body. Symptoms can include pain or discomfort in one or both arms, the back, neck, jaw, or stomach.  Shortness of breath with or without chest discomfort.  Other signs may include breaking out in a cold sweat, nausea, or lightheadedness. Don't wait more than five minutes to call 911 - MINUTES MATTER! Fast action can save your life. Calling 911 is almost always the fastest way to get lifesaving treatment. Emergency Medical Services staff can begin treatment when they arrive -- up to an hour sooner than if someone gets to the hospital by car. The discharge information has been reviewed with the patient. The patient verbalized understanding. Discharge medications reviewed with the patient and appropriate educational materials and side effects teaching were provided.     Patient armband removed and shredded

## 2017-09-29 NOTE — DISCHARGE SUMMARY
Discharge Summary    Patient: Ana Maria Block MRN: [id number]  CSN: [id number]    YOB: 1975  Age: 43 y.o. Sex: male    DOA: 9/25/2017 LOS:  LOS: 4 days   Discharge Date:      Admission Diagnoses: septic right knee  Septic arthritis of knee, right [facility])    Discharge Diagnoses:  PLEASE SEE DICTATION. Discharge Condition: Stable    PHYSICAL EXAM  Visit Vitals    /88 (BP 1 Location: Right arm, BP Patient Position: At rest)    Pulse 91    Temp 97.8 °F (36.6 °C)    Resp 18    Ht 5' 5\" (1.651 m)    Wt 77.1 kg (170 lb)    SpO2 98%    BMI 28.29 kg/m2       General: Alert, cooperative, no acute distress    Lungs:  CTA Bilaterally. Heart:  Regular rate and Rhythm. Abdomen: Soft, Non distended, Non tender. + Bowel sounds. Extremities: R knee wound clean, mild swelling. No redness or erythema. Psych:   Good insight. Not anxious or agitated. Neurologic:  AA oriented X 3. Moves all extremities. Hospital Course: Please see dictation. code # [id number]. Discharge Medications:     Current Discharge Medication List      START taking these medications    Details   amLODIPine (NORVASC) 10 mg tablet Take 1 Tab by mouth daily. Qty: 30 Tab, Refills: 0      cloNIDine HCl (CATAPRES) 0.1 mg tablet Take 1 Tab by mouth two (2) times a day. Qty: 60 Tab, Refills: 0      docusate sodium (COLACE) 100 mg capsule Take 1 Cap by mouth two (2) times daily as needed for Constipation for up to 90 days. Qty: 60 Cap, Refills: 0      linezolid (ZYVOX) 600 mg tablet Take 1 Tab by mouth every twelve (12) hours for 40 days. Indications: STAPHYLOCOCCUS AUREUS SKIN AND SKIN STRUCTURE INFECTION  Qty: 80 Tab, Refills: 0         CONTINUE these medications which have CHANGED    Details   oxyCODONE-acetaminophen (PERCOCET) 5-325 mg per tablet Take 1 Tab by mouth every four (4) hours as needed. Max Daily Amount: 6 Tabs.   Qty: 20 Tab, Refills: 0         CONTINUE these medications which have NOT CHANGED    Details   neomycin-polymyxin-hydrocortisone (CORTISPORIN) otic solution Administer 3-4 Drops in right ear four (4) times daily. For 10 days  Qty: 10 mL, Refills: 0           · It is important that you take the medication exactly as they are prescribed. · Keep your medication in the bottles provided by the pharmacist and keep a list of the medication names, dosages, and times to be taken in your wallet. · Do not take other medications without consulting your doctor. Follow up with PCP Dr. Hawa Bertrand in 1 week  Follow up with Dr. Teresita Bryan in 1 week    [place] [id number] arranged for wound care, med management.       Minutes spent on discharge: 40 minutes spent coordinating this discharge (review instructions/follow-up, prescriptions, preparing report for sign off)    Teri Burns MD  9/29/2017 11:16 AM

## 2017-09-29 NOTE — PROGRESS NOTES
Bedside shift change report given to Michael Cooper RN (oncoming nurse) by Stefany Melgar RN (offgoing nurse). Report included the following information SBAR, Kardex, Intake/Output, MAR, Recent Results and Med Rec Status.

## 2017-09-30 ENCOUNTER — HOME CARE VISIT (OUTPATIENT)
Dept: SCHEDULING | Facility: HOME HEALTH | Age: 42
End: 2017-09-30

## 2017-09-30 PROCEDURE — G0299 HHS/HOSPICE OF RN EA 15 MIN: HCPCS

## 2017-09-30 PROCEDURE — 400013 HH SOC

## 2017-10-01 ENCOUNTER — HOME CARE VISIT (OUTPATIENT)
Dept: SCHEDULING | Facility: HOME HEALTH | Age: 42
End: 2017-10-01

## 2017-10-01 VITALS
TEMPERATURE: 98.7 F | SYSTOLIC BLOOD PRESSURE: 166 MMHG | OXYGEN SATURATION: 98 % | DIASTOLIC BLOOD PRESSURE: 110 MMHG | HEART RATE: 90 BPM

## 2017-10-01 LAB
BACTERIA SPEC CULT: NORMAL
GRAM STN SPEC: NORMAL
GRAM STN SPEC: NORMAL
SERVICE CMNT-IMP: NORMAL

## 2017-10-01 PROCEDURE — G0151 HHCP-SERV OF PT,EA 15 MIN: HCPCS

## 2017-10-02 ENCOUNTER — HOME CARE VISIT (OUTPATIENT)
Dept: HOME HEALTH SERVICES | Facility: HOME HEALTH | Age: 42
End: 2017-10-02

## 2017-10-02 ENCOUNTER — HOME CARE VISIT (OUTPATIENT)
Dept: SCHEDULING | Facility: HOME HEALTH | Age: 42
End: 2017-10-02

## 2017-10-02 VITALS
DIASTOLIC BLOOD PRESSURE: 70 MMHG | SYSTOLIC BLOOD PRESSURE: 120 MMHG | HEIGHT: 65 IN | HEART RATE: 70 BPM | TEMPERATURE: 98.2 F | WEIGHT: 170 LBS | OXYGEN SATURATION: 96 % | BODY MASS INDEX: 28.32 KG/M2 | RESPIRATION RATE: 18 BRPM

## 2017-10-02 PROCEDURE — G0155 HHCP-SVS OF CSW,EA 15 MIN: HCPCS

## 2017-10-02 NOTE — DISCHARGE SUMMARY
[place] [address] [place] SUMMARY    Name:  Jeanmarie Ferrera  MR#:  [id number]  :  1975  Account #:  [de-identified]  Date of Adm:  2017  Date of Discharge:  2017      PRIMARY CARE DOCTOR: Does not have one, will be following with  Emelina Mcnamara NP, or Annemarie Smith MD.    DISPOSITION: Discharged to home health care. DISCHARGE CONDITION: Stable. DISCHARGE DIAGNOSES:  1. Right knee and leg cellulitis. 2. Right knee septic arthritis, status post incision and drainage and  partial synovectomy on 2017. 3. Methicillin-resistant Staphylococcus aureus infection of right knee. 4. Hypertension. 5. Acute renal failure, resolved now. 6. Leukocytosis improved now. DISCHARGE MEDICATIONS:  1. Amlodipine 10 mg daily. 2. Catapres 0.1 mg b.i.d.  3. Colace 100 mg b.i.d.  4. Zyvox 600 mg b.i.d. for 40 days. 5. Percocet 1 tablet every 4 hours p.r.n. CONSULTATIONS IN THE HOSPITAL:  1. Consultation with Dr. Cristina Akbar, Infectious Disease. 2. Consultation, Dr. Luciano Joe, Orthopedic Surgery. MAJOR PROCEDURES IN THE HOSPITAL: The patient underwent I  and D, arthroscopy and synovectomy by Dr. Luciano Joe. MAJOR INVESTIGATIONS DURING THE HOSPITAL STAY:    1. The patient had a right lower extremity PVLs, which were negative  for DVT. 2. The patient had MRI knee which showed a significant superficial  knee soft tissue inflammation, cellulitis. Anterolateral knee fluid  infection, most commonly suggest infectious prepatellar bursitis  or abscess. HOSPITAL COURSE: A 45-year-old  male who  presents to the emergency room with complaining of right knee pain. The patient in the emergency room was noted to have right knee  cellulitis, concerning for septic knee. The patient was admitted to the  hospital. MRI was ordered and ortho was consulted, was started on  empiric antibiotics.  The patient was seen by Ortho was taken to OR  and arthrocentesis was done along with washout. MRI confirmed  infection. Septic arthritis. The patient arthroscopy, incision and  drainage with washout and also partial synovectomy by Dr. Bertin Serrano. Postoperatively, cultures were followed. Cultures came back MRSA. ID  was consulted. ID continued the patient on vancomycin. ID had a long-  term plan of IV antibiotics with vancomycin, but the patient declined IV  antibiotics. The patient started crying that he is going to lose his job if  he has to go on IV antibiotics and he would not be able to function and  eventually will have to stop antibiotics, so he requested for p.o.  antibiotics or else he would prefer not to get any antibiotics. After  discussing with the patient that vancomycin would be his first choice  and Zyvox would be the 2nd choice and treatment failure would be a  possibility with the Zyvox. The patient completely understands the risk  of having recurrent infection and losing limb and he says he wants  to have Zyvox alone. After discussion by myself and also ID, decision  was made that the patient will take Zyvox, if we give IV vancomycin he  might not finish his treatment, so decision was made to change to p.o. Zyvox. The  was involved for arranging Zyvox from the  Zyvox program. The patient remained stable and PT, OT saw the  patient and recommended home health care, home health care was  arranged and the patient was discharged home with home health care. Ortho also saw the patient on the discharge, there was a small blister  on his wound which was drained and dressings were changed and  Ortho recommended outpatient followup. The patient is currently  hemodynamically and medically stable for discharge. The patient was  discharged home today. Discharge instructions, followup appointments and physical exam,  please refer to the medical records. The patient is alert, awake, oriented x3. I discussed with the patient  about discharge plan and followup appointments. He completely  understood and agreed with the plan. I also answered all his questions  and concerns appropriately.         Ema Randle MD BT / SHARIF  D:  10/02/2017   11:00  T:  10/02/2017   14:31  Job #:  [id number]

## 2017-10-03 ENCOUNTER — HOME CARE VISIT (OUTPATIENT)
Dept: HOME HEALTH SERVICES | Facility: HOME HEALTH | Age: 42
End: 2017-10-03

## 2017-10-03 ENCOUNTER — HOME CARE VISIT (OUTPATIENT)
Dept: SCHEDULING | Facility: HOME HEALTH | Age: 42
End: 2017-10-03

## 2017-10-03 PROCEDURE — G0300 HHS/HOSPICE OF LPN EA 15 MIN: HCPCS

## 2017-10-04 ENCOUNTER — HOME CARE VISIT (OUTPATIENT)
Dept: HOME HEALTH SERVICES | Facility: HOME HEALTH | Age: 42
End: 2017-10-04

## 2017-10-04 NOTE — TELEPHONE ENCOUNTER
Patient called to inquire about pain medication. Had emergency sx on rt knee 9/26, currently out of pain medication, says he was doing ok until therapy over weekend and now the pain is unbearable. His concern is about the cost of medication as he is self-pay and says the last prescription \"put him out\" financially. Follow up appointment was rescheduled by office for 10/6. Can we offer any advice for patient regarding pain meds?   Patient can be reached at  [phone]

## 2017-10-05 ENCOUNTER — HOME CARE VISIT (OUTPATIENT)
Dept: HOME HEALTH SERVICES | Facility: HOME HEALTH | Age: 42
End: 2017-10-05

## 2017-10-05 VITALS
HEART RATE: 90 BPM | OXYGEN SATURATION: 98 % | DIASTOLIC BLOOD PRESSURE: 71 MMHG | SYSTOLIC BLOOD PRESSURE: 87 MMHG | RESPIRATION RATE: 18 BRPM

## 2017-10-05 NOTE — TELEPHONE ENCOUNTER
Home health nurse erendira mcneil called:   reports per patient, his pain is not being controlled. Patient told her physical therapy services and home health services were useless and kicked them out.   Please advise  Nurse erendira hernandez can be reached at  R#[phone]

## 2017-10-05 NOTE — Clinical Note
E-mail from nurse assigned to see client today, e-mail stated he was very upset and dissatisfied with services, could do wound care himself, PT pushed to hard. Call placed to client, he stated he was ok with nursing visits made, felt he could/has been doing dressing changes and could continue, felt that PT overworked him, pain increased the evening after treatment. With nurse visit on Tuesday, he stated he changed the dressing about 15 minutes prior to nurse visit, stated his BP was down he thought due to not drinking enough fluids. He was upset that nurse addressed issue of all of his pain medication gone. Questioned nurse on how to obtain more. Stated he placed a call to MD office 10/4 call marked urgent and still has not heard anything back. Has apt 10/6 at 4:15. I reviewed clients medication with him, he stated he missed one Rx called Linezolid, explained that was  generic name for Zyvox,  stated he was already taking. Good handwashing,  stressed. No further nursing or PT needed per pt.

## 2017-10-06 ENCOUNTER — HOME CARE VISIT (OUTPATIENT)
Dept: HOME HEALTH SERVICES | Facility: HOME HEALTH | Age: 42
End: 2017-10-06

## 2017-10-06 RX ORDER — OXYCODONE AND ACETAMINOPHEN 5; 325 MG/1; MG/1
1 TABLET ORAL
Qty: 20 TAB | Refills: 0 | Status: SHIPPED | OUTPATIENT
Start: 2017-10-06 | End: 2017-10-24 | Stop reason: SDUPTHER

## 2017-10-06 NOTE — TELEPHONE ENCOUNTER
Date of Surgery: 09/26/2017 Knee Arthroscopy/Incision and Drainage with Washout   Next Appointment: 10/10/2017 with SHANTI Salmon   Previous Refill Encounters: 09/29/2017 per MD [facility] #20     Requested Prescriptions     Pending Prescriptions Disp Refills    oxyCODONE-acetaminophen (PERCOCET) 5-325 mg per tablet 20 Tab 0     Sig: Take 1 Tab by mouth every four (4) hours as needed for Pain. Max Daily Amount: 6 Tabs.

## 2017-10-07 ENCOUNTER — HOME CARE VISIT (OUTPATIENT)
Dept: HOME HEALTH SERVICES | Facility: HOME HEALTH | Age: 42
End: 2017-10-07

## 2017-10-08 ENCOUNTER — HOME CARE VISIT (OUTPATIENT)
Dept: SCHEDULING | Facility: HOME HEALTH | Age: 42
End: 2017-10-08

## 2017-10-08 ENCOUNTER — HOME CARE VISIT (OUTPATIENT)
Dept: HOME HEALTH SERVICES | Facility: HOME HEALTH | Age: 42
End: 2017-10-08

## 2017-10-08 PROCEDURE — G0157 HHC PT ASSISTANT EA 15: HCPCS

## 2017-10-09 ENCOUNTER — HOME CARE VISIT (OUTPATIENT)
Dept: HOME HEALTH SERVICES | Facility: HOME HEALTH | Age: 42
End: 2017-10-09

## 2017-10-10 ENCOUNTER — OFFICE VISIT (OUTPATIENT)
Dept: ORTHOPEDIC SURGERY | Facility: CLINIC | Age: 42
End: 2017-10-10

## 2017-10-10 VITALS
WEIGHT: 185.2 LBS | TEMPERATURE: 97.2 F | SYSTOLIC BLOOD PRESSURE: 132 MMHG | BODY MASS INDEX: 30.86 KG/M2 | OXYGEN SATURATION: 97 % | DIASTOLIC BLOOD PRESSURE: 87 MMHG | RESPIRATION RATE: 15 BRPM | HEART RATE: 74 BPM | HEIGHT: 65 IN

## 2017-10-10 VITALS
HEART RATE: 82 BPM | OXYGEN SATURATION: 98 % | TEMPERATURE: 97.8 F | DIASTOLIC BLOOD PRESSURE: 86 MMHG | SYSTOLIC BLOOD PRESSURE: 148 MMHG

## 2017-10-10 DIAGNOSIS — M00.061 STAPHYLOCOCCAL ARTHRITIS OF RIGHT KNEE (HCC): ICD-10-CM

## 2017-10-10 DIAGNOSIS — G89.18 POSTOPERATIVE PAIN: Primary | ICD-10-CM

## 2017-10-10 DIAGNOSIS — Z22.322 MRSA (METHICILLIN RESISTANT STAPH AUREUS) CULTURE POSITIVE: ICD-10-CM

## 2017-10-10 NOTE — PROGRESS NOTES
HISTORY OF PRESENT ILLNESS:  Susana Potts returns. He is 15 days status post his right knee arthroscopy with synovectomy and washout. He was previously found to have MRSA of the joint. He had a small abscess on the outer portion of his lower right proximal leg, which was MRSA positive and likely the nidus for his intra-articular infection. Today, he presents with pain well managed. He has maintained his surgical site as directed. He is on Percocet for pain management. He is taking his antibiotics in the form of Zyvox as directed. PHYSICAL EXAM:  The knee reveals over the anteromedial joint portals, intact surgical incision with nylon sutures present. The wound borders are well approximated. He has a trace effusion. Active motion is 90-5°. Distal sensation is intact fully to the right lower extremity. There is no calf tenderness or evidence of DVT to the right lower extremity. PROCEDURE:  Using clean technique, all sutures are removed with no complications. Sterile Band-Aids were placed. PLAN:   The patient may get the surgical site wet but will avoid any prolonged soaking. We will plan on seeing him back in about two weeks. Recommendation is to complete antibiotics as directed.

## 2017-10-10 NOTE — MR AVS SNAPSHOT
Visit Information Date & Time Provider Department Dept. Phone Encounter #  
 10/10/2017  3:45 PM Stephanie [place], [address] [facility] [employer] [phone] Follow-up Instructions Return in about 2 weeks (around 10/24/2017). Your Appointments 10/13/2017  3:00 PM  
HOSPITAL FOLLOW-UP with MD Elder Felton[address] [facility] Appt Note: post  
 [address] [id number]  
[place] [id number] Upcoming Health Maintenance Date Due Pneumococcal 19-64 Highest Risk (1 of 3 - PCV13) 6/25/1994 DTaP/Tdap/Td series (1 - Tdap) 6/25/1996 INFLUENZA AGE 9 TO ADULT 8/1/2017 Allergies as of 10/10/2017  Review Complete On: 10/10/2017 By: Rizwana García LPN No Known Allergies Current Immunizations  Never Reviewed No immunizations on file. Not reviewed this visit You Were Diagnosed With   
  
 Codes Comments Postoperative pain    -  Primary ICD-10-CM: G89.18 
ICD-9-CM: 338.18 Staphylococcal arthritis of right knee ([address]     ICD-10-CM: M00.061 ICD-9-CM: 711.06, 041.10 MRSA (methicillin resistant staph aureus) culture positive     ICD-10-CM: Z22.200 ICD-9-CM: V02.54 Vitals BP Pulse Temp Resp Height(growth percentile) Weight(growth percentile) 132/87 74 97.2 °F (36.2 °C) 15 5' 5\" (1.651 m) 185 lb 3.2 oz (84 kg) SpO2 BMI Smoking Status 97% 30.82 kg/m2 Former Smoker Vitals History BMI and BSA Data Body Mass Index Body Surface Area  
 30.82 kg/m 2 1.96 m 2 Your Updated Medication List  
  
   
This list is accurate as of: 10/10/17  4:21 PM.  Always use your most recent med list. amLODIPine 10 mg tablet Commonly known as:  Bassam Conine Take 1 Tab by mouth daily. cloNIDine HCl 0.1 mg tablet Commonly known as:  CATAPRES  
 Take 1 Tab by mouth two (2) times a day. docusate sodium 100 mg capsule Commonly known as:  Gejesenia Living Take 1 Cap by mouth two (2) times daily as needed for Constipation for up to 90 days. linezolid 600 mg tablet Commonly known as:  Maren William Take 1 Tab by mouth every twelve (12) hours for 40 days. Indications: STAPHYLOCOCCUS AUREUS SKIN AND SKIN STRUCTURE INFECTION  
  
 neomycin-polymyxin-hydrocortisone otic solution Commonly known as:  CORTISPORIN Administer 3-4 Drops in right ear four (4) times daily. For 10 days * oxyCODONE-acetaminophen 5-325 mg per tablet Commonly known as:  PERCOCET Take 1 Tab by mouth every four (4) hours as needed. Max Daily Amount: 6 Tabs. * oxyCODONE-acetaminophen 5-325 mg per tablet Commonly known as:  PERCOCET Take 1 Tab by mouth every four (4) hours as needed for Pain. Max Daily Amount: 6 Tabs. * Notice: This list has 2 medication(s) that are the same as other medications prescribed for you. Read the directions carefully, and ask your doctor or other care provider to review them with you. Follow-up Instructions Return in about 2 weeks (around 10/24/2017). Introducing [facility]! Della Lamar introduces PA Semi patient portal. Now you can access parts of your medical record, email your doctor's office, and request medication refills online. 1. In your internet browser, go to [email or web address] 2. Click on the First Time User? Click Here link in the Sign In box. You will see the New Member Sign Up page. 3. Enter your PA Semi Access Code exactly as it appears below. You will not need to use this code after youve completed the sign-up process. If you do not sign up before the expiration date, you must request a new code. · PA Semi Access Code: [email or web address] Expires: 12/27/2017  1:44 PM 
 
4.  Enter the last four digits of your Social Security Number (xxxx) and Date of Birth (mm/dd/yyyy) as indicated and click Submit. You will be taken to the next sign-up page. 5. Create a Penzata ID. This will be your Penzata login ID and cannot be changed, so think of one that is secure and easy to remember. 6. Create a Penzata password. You can change your password at any time. 7. Enter your Password Reset Question and Answer. This can be used at a later time if you forget your password. 8. Enter your e-mail address. You will receive e-mail notification when new information is available in [address] 9. Click Sign Up. You can now view and download portions of your medical record. 10. Click the Download Summary menu link to download a portable copy of your medical information. If you have questions, please visit the Frequently Asked Questions section of the Penzata website. Remember, Penzata is NOT to be used for urgent needs. For medical emergencies, dial 911. Now available from your iPhone and Android! Please provide this summary of care documentation to your next provider. Your primary care clinician is listed as NONE. If you have any questions after today's visit, please call [phone].

## 2017-10-24 ENCOUNTER — HOSPITAL ENCOUNTER (OUTPATIENT)
Dept: LAB | Age: 42
Discharge: HOME | End: 2017-10-24
Payer: SELF-PAY

## 2017-10-24 ENCOUNTER — OFFICE VISIT (OUTPATIENT)
Dept: ORTHOPEDIC SURGERY | Facility: CLINIC | Age: 42
End: 2017-10-24

## 2017-10-24 VITALS
HEART RATE: 105 BPM | RESPIRATION RATE: 20 BRPM | OXYGEN SATURATION: 97 % | TEMPERATURE: 98.3 F | DIASTOLIC BLOOD PRESSURE: 93 MMHG | SYSTOLIC BLOOD PRESSURE: 153 MMHG | HEIGHT: 65 IN

## 2017-10-24 DIAGNOSIS — M00.061 STAPHYLOCOCCAL ARTHRITIS OF RIGHT KNEE (HCC): Primary | ICD-10-CM

## 2017-10-24 DIAGNOSIS — M25.461 KNEE EFFUSION, RIGHT: ICD-10-CM

## 2017-10-24 DIAGNOSIS — Z22.322 MRSA (METHICILLIN RESISTANT STAPH AUREUS) CULTURE POSITIVE: ICD-10-CM

## 2017-10-24 LAB
APPEARANCE SNV: NORMAL
BODY FLD TYPE: NORMAL
COLOR SNV: NORMAL
CRYSTALS FLD MICRO: NORMAL
EOSINOPHIL NFR FLD MANUAL: 0 %
LYMPHOCYTES NFR FLD: 30 %
MONOCYTES NFR FLD: 24 %
NEUTROPHILS NFR FLD: 46 %
NEUTS BAND # FLD: 0 %
RBC # SNV: NORMAL /CU MM
SPECIMEN SOURCE FLD: NORMAL
WBC # SNV: 110 /CU MM (ref 0–200)

## 2017-10-24 PROCEDURE — 89050 BODY FLUID CELL COUNT: CPT | Performed by: PHYSICIAN ASSISTANT

## 2017-10-24 PROCEDURE — 87070 CULTURE OTHR SPECIMN AEROBIC: CPT | Performed by: PHYSICIAN ASSISTANT

## 2017-10-24 PROCEDURE — 87075 CULTR BACTERIA EXCEPT BLOOD: CPT | Performed by: PHYSICIAN ASSISTANT

## 2017-10-24 PROCEDURE — 89060 EXAM SYNOVIAL FLUID CRYSTALS: CPT | Performed by: PHYSICIAN ASSISTANT

## 2017-10-24 RX ORDER — OXYCODONE AND ACETAMINOPHEN 5; 325 MG/1; MG/1
1 TABLET ORAL
Qty: 20 TAB | Refills: 0 | Status: SHIPPED | OUTPATIENT
Start: 2017-10-24

## 2017-10-24 NOTE — MR AVS SNAPSHOT
Visit Information Date & Time Provider Department Dept. Phone Encounter #  
 10/24/2017  4:00 PM Letha Phillips PA-C [place] [facility] [phone] [id number] Upcoming Health Maintenance Date Due Pneumococcal 19-64 Highest Risk (1 of 3 - PCV13) 6/25/1994 DTaP/Tdap/Td series (1 - Tdap) 6/25/1996 INFLUENZA AGE 9 TO ADULT 8/1/2017 Allergies as of 10/24/2017  Review Complete On: 10/24/2017 By: Letha Phillips PA-C No Known Allergies Current Immunizations  Never Reviewed No immunizations on file. Not reviewed this visit You Were Diagnosed With   
  
 Codes Comments Staphylococcal arthritis of right knee ([facility] [address]    -  Primary ICD-10-CM: M00.061 ICD-9-CM: 711.06, 041.10 MRSA (methicillin resistant staph aureus) culture positive     ICD-10-CM: Z22.200 ICD-9-CM: V02.54 Knee effusion, right     ICD-10-CM: M25.461 ICD-9-CM: 719.06 Vitals BP Pulse Temp Resp Height(growth percentile) SpO2  
 (!) 153/93 (BP 1 Location: Left arm, BP Patient Position: Sitting) (!) 105 98.3 °F (36.8 °C) (Oral) 20 5' 5\" (1.651 m) 97% Smoking Status Former Smoker Your Updated Medication List  
  
   
This list is accurate as of: 10/24/17  4:25 PM.  Always use your most recent med list. amLODIPine 10 mg tablet Commonly known as:  Mandujano Del Take 1 Tab by mouth daily. cloNIDine HCl 0.1 mg tablet Commonly known as:  CATAPRES Take 1 Tab by mouth two (2) times a day. docusate sodium 100 mg capsule Commonly known as:  Maretta Sampson Take 1 Cap by mouth two (2) times daily as needed for Constipation for up to 90 days. linezolid 600 mg tablet Commonly known as:  Benjamin Benton Take 1 Tab by mouth every twelve (12) hours for 40 days. Indications: STAPHYLOCOCCUS AUREUS SKIN AND SKIN STRUCTURE INFECTION  
  
 neomycin-polymyxin-hydrocortisone otic solution Commonly known as:  CORTISPORIN Administer 3-4 Drops in right ear four (4) times daily. For 10 days * oxyCODONE-acetaminophen 5-325 mg per tablet Commonly known as:  PERCOCET Take 1 Tab by mouth every four (4) hours as needed. Max Daily Amount: 6 Tabs. * oxyCODONE-acetaminophen 5-325 mg per tablet Commonly known as:  PERCOCET Take 1 Tab by mouth every four (4) hours as needed for Pain. Max Daily Amount: 6 Tabs. Indications: Pain * Notice: This list has 2 medication(s) that are the same as other medications prescribed for you. Read the directions carefully, and ask your doctor or other care provider to review them with you. Prescriptions Printed Refills  
 oxyCODONE-acetaminophen (PERCOCET) 5-325 mg per tablet 0 Sig: Take 1 Tab by mouth every four (4) hours as needed for Pain. Max Daily Amount: 6 Tabs. Indications: Pain Class: Print Route: Oral  
  
We Performed the Following DRAIN/INJECT LARGE JOINT/BURSA [id number] CPT(R)] To-Do List   
 10/24/2017 Lab:  CELL COUNT, SYNOVIAL   
  
 10/24/2017 Lab:  CRYSTALS, SYNOVIAL FLUID   
  
 10/24/2017 Microbiology:  CULTURE, ANAEROBIC   
  
 10/24/2017 Microbiology:  CULTURE, BODY FLUID W GRAM STAIN   
  
 10/25/2017 Imaging:  DUPLEX LOWER EXT VENOUS RIGHT   
  
 10/25/2017 Imaging:  XR CHEST PA LAT Introducing [facility]! Leida Walden introduces Microblr patient portal. Now you can access parts of your medical record, email your doctor's office, and request medication refills online. 1. In your internet browser, go to [email or web address] 2. Click on the First Time User? Click Here link in the Sign In box. You will see the New Member Sign Up page. 3. Enter your Microblr Access Code exactly as it appears below. You will not need to use this code after youve completed the sign-up process.  If you do not sign up before the expiration date, you must request a new code. · Sobrr Access Code: [email or web address] Expires: 12/27/2017  1:44 PM 
 
4. Enter the last four digits of your Social Security Number (xxxx) and Date of Birth (mm/dd/yyyy) as indicated and click Submit. You will be taken to the next sign-up page. 5. Create a Sobrr ID. This will be your Sobrr login ID and cannot be changed, so think of one that is secure and easy to remember. 6. Create a Sobrr password. You can change your password at any time. 7. Enter your Password Reset Question and Answer. This can be used at a later time if you forget your password. 8. Enter your e-mail address. You will receive e-mail notification when new information is available in [address] 9. Click Sign Up. You can now view and download portions of your medical record. 10. Click the Download Summary menu link to download a portable copy of your medical information. If you have questions, please visit the Frequently Asked Questions section of the Sobrr website. Remember, Sobrr is NOT to be used for urgent needs. For medical emergencies, dial 911. Now available from your iPhone and Android! Please provide this summary of care documentation to your next provider. Your primary care clinician is listed as NONE. If you have any questions after today's visit, please call [phone].

## 2017-10-24 NOTE — PROGRESS NOTES
HISTORY OF PRESENT ILLNESS:  Yumiko Trujillo returns. He is one month status post his right knee arthroscopy with synovectomy and MRSA positive aspirate. He has continued his antibiotics and is requesting pain medication today. He has noticed his right knee is swollen again. He may have tome heat to the knee by report.  he has had some symptoms of chest congestion, no cough. He has noticed swelling to the right leg. He is on appropriate MRSA antibiotics. PHYSICAL EXAM:  The right knee reveals a 2+ effusion. HE has no erythema. There is trace warmth. He is limited with his bending at 70-20°. He has 1+ pitting edema in the right lower leg and some diffuse tenderness in the back of the right calf. PROCEDURE:  The right knee today, using sterile technique, verbal and written consent were obtained, was aspirated of 52 cc of bloody synovial fluid with some particulate floating, white in characteristic. No jenny pus. The aspirate was shared in a green top, purple top, and red top to be send for aerobic, anaerobic, Gram stain, cell count, and crystals. PLAN:   He was refilled of his Percocet today. We will see him back based on the lab assay. He is full weight bearing as tolerated of his right lower extremity. The patient will complete his current antibiotics. We are also going to send him tomorrow to [facility] once scheduled for a non-invasive study of his right calf, as well as a chest x-ray.

## 2017-10-25 ENCOUNTER — HOSPITAL ENCOUNTER (OUTPATIENT)
Dept: VASCULAR SURGERY | Age: 42
Discharge: HOME | End: 2017-10-25
Attending: PHYSICIAN ASSISTANT
Payer: SELF-PAY

## 2017-10-25 ENCOUNTER — HOSPITAL ENCOUNTER (OUTPATIENT)
Dept: GENERAL RADIOLOGY | Age: 42
Discharge: HOME | End: 2017-10-25
Attending: PHYSICIAN ASSISTANT
Payer: SELF-PAY

## 2017-10-25 DIAGNOSIS — M25.461 KNEE EFFUSION, RIGHT: ICD-10-CM

## 2017-10-25 DIAGNOSIS — Z22.322 MRSA (METHICILLIN RESISTANT STAPH AUREUS) CULTURE POSITIVE: ICD-10-CM

## 2017-10-25 PROCEDURE — 71020 XR CHEST PA LAT: CPT

## 2017-10-25 PROCEDURE — 93971 EXTREMITY STUDY: CPT

## 2017-10-25 NOTE — PROCEDURES
[facility]  *** [facility] ***    Name: Washington Guerrero  MRN: [id number]    Outpatient  : 1975  HIS Order #: [id number]  [address] [facility] Visit #: [id number]  Date: 25 Oct 2017    TYPE OF TEST: Peripheral Venous Testing    REASON FOR TEST    Right Leg:-  Deep venous thrombosis:           No  Superficial venous thrombosis:    No  Deep venous insufficiency:        Not examined  Superficial venous insufficiency: Not examined      INTERPRETATION/FINDINGS  Right leg :  Duplex images were obtained using 2-D gray scale, color flow, and  spectral Doppler analysis. 1. No evidence of deep venous thrombosis detected in the veins  visualized. 2. Deep veins visualized include the common femoral, femoral,  popliteal, posterior tibial and peroneal veins. 3. No evidence of superficial thrombosis detected. 4. Superficial veins visualized include the proximal great saphenous  vein. 5. No evidence of deep vein thrombosis in the contralateral common  femoral vein. ADDITIONAL COMMENTS  No change when compared to previous exam on 17. I have personally reviewed the data relevant to the interpretation of  this  study. TECHNOLOGIST: Autumn Christianson RVT  Signed: 10/25/2017 03:40 PM    PHYSICIAN: Brie Dinero MD  Signed: 10/25/2017 08:42 PM

## 2017-10-29 LAB
BACTERIA SPEC CULT: NORMAL
BACTERIA SPEC CULT: NORMAL
GRAM STN SPEC: NORMAL
GRAM STN SPEC: NORMAL
SERVICE CMNT-IMP: NORMAL
SERVICE CMNT-IMP: NORMAL

## 2019-03-18 ENCOUNTER — HOSPITAL ENCOUNTER (EMERGENCY)
Age: 44
Discharge: HOME | End: 2019-03-18
Attending: EMERGENCY MEDICINE
Payer: SELF-PAY

## 2019-03-18 ENCOUNTER — APPOINTMENT (OUTPATIENT)
Dept: GENERAL RADIOLOGY | Age: 44
End: 2019-03-18
Attending: EMERGENCY MEDICINE
Payer: SELF-PAY

## 2019-03-18 VITALS
OXYGEN SATURATION: 93 % | DIASTOLIC BLOOD PRESSURE: 94 MMHG | TEMPERATURE: 97.3 F | SYSTOLIC BLOOD PRESSURE: 152 MMHG | HEART RATE: 76 BPM | RESPIRATION RATE: 13 BRPM

## 2019-03-18 DIAGNOSIS — M25.512 ACUTE PAIN OF LEFT SHOULDER: Primary | ICD-10-CM

## 2019-03-18 PROCEDURE — 93005 ELECTROCARDIOGRAM TRACING: CPT

## 2019-03-18 PROCEDURE — 73030 X-RAY EXAM OF SHOULDER: CPT

## 2019-03-18 PROCEDURE — 99284 EMERGENCY DEPT VISIT MOD MDM: CPT

## 2019-03-18 PROCEDURE — 74011250637 HC RX REV CODE- 250/637: Performed by: EMERGENCY MEDICINE

## 2019-03-18 RX ORDER — IBUPROFEN 400 MG/1
800 TABLET ORAL
Status: DISCONTINUED | OUTPATIENT
Start: 2019-03-18 | End: 2019-03-18

## 2019-03-18 RX ORDER — ACETAMINOPHEN 500 MG
1000 TABLET ORAL
Status: COMPLETED | OUTPATIENT
Start: 2019-03-18 | End: 2019-03-18

## 2019-03-18 RX ADMIN — ACETAMINOPHEN 1000 MG: 500 TABLET ORAL at 20:29

## 2019-03-18 NOTE — ED TRIAGE NOTES
Patient arrived from home c/o left upper shoulder pain radiating with pain. Patient also has hypertension untreated. Patient does not have primary care at this time.

## 2019-03-19 LAB
ATRIAL RATE: 84 BPM
CALCULATED P AXIS, ECG09: 42 DEGREES
CALCULATED R AXIS, ECG10: 29 DEGREES
CALCULATED T AXIS, ECG11: 55 DEGREES
DIAGNOSIS, 93000: NORMAL
P-R INTERVAL, ECG05: 130 MS
Q-T INTERVAL, ECG07: 366 MS
QRS DURATION, ECG06: 76 MS
QTC CALCULATION (BEZET), ECG08: 432 MS
VENTRICULAR RATE, ECG03: 84 BPM

## 2019-03-19 NOTE — ED PROVIDER NOTES
EMERGENCY DEPARTMENT HISTORY AND PHYSICAL EXAM    9:17 PM      Date: 3/18/2019  Patient Name: Muna Guillory    History of Presenting Illness     Chief Complaint   Patient presents with    Shoulder Pain         History Provided By: Patient    Additional History (Context): Muna Guillory is a 37 y.o. male with No significant past medical history who presents with complaint of 2 days of left shoulder pain. He states that he woke up with this on Sunday morning and had assumed that he had slept on it wrong. He denies any injury. 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 He denies any weakness, numbness, tingling of that arm. Denies any other known injury. The pain is worse with the arm fully abducted. Minor Ronde PCP: None    Current Facility-Administered Medications   Medication Dose Route Frequency Provider Last Rate Last Dose    ibuprofen (MOTRIN) tablet 800 mg  800 mg Oral NOW Jet Astudillo MD         Current Outpatient Medications   Medication Sig Dispense Refill    oxyCODONE-acetaminophen (PERCOCET) 5-325 mg per tablet Take 1 Tab by mouth every four (4) hours as needed for Pain. Max Daily Amount: 6 Tabs. Indications: Pain 20 Tab 0    amLODIPine (NORVASC) 10 mg tablet Take 1 Tab by mouth daily. 30 Tab 0    cloNIDine HCl (CATAPRES) 0.1 mg tablet Take 1 Tab by mouth two (2) times a day. 60 Tab 0    oxyCODONE-acetaminophen (PERCOCET) 5-325 mg per tablet Take 1 Tab by mouth every four (4) hours as needed. Max Daily Amount: 6 Tabs. (Patient taking differently: Take 1 Tab by mouth every four (4) hours as needed for Pain.) 20 Tab 0    neomycin-polymyxin-hydrocortisone (CORTISPORIN) otic solution Administer 3-4 Drops in right ear four (4) times daily. For 10 days (Patient not taking: Reported on 9/30/2017) 10 mL 0       Past History     Past Medical History:  History reviewed. No pertinent past medical history.     Past Surgical History:  Past Surgical History:   Procedure Laterality Date    HX OTHER SURGICAL      abscess wash out on right knee       Family History:  Family History   Family history unknown: Yes       Social History:  Social History     Tobacco Use    Smoking status: Former Smoker     Last attempt to quit: 10/5/2017     Years since quittin.4    Smokeless tobacco: Never Used   Substance Use Topics    Alcohol use: Yes     Comment: socially    Drug use: No       Allergies:  No Known Allergies      Review of Systems       Review of Systems   Constitutional: Negative for fever. Musculoskeletal: Positive for arthralgias and myalgias. Skin: Negative for rash. Neurological: Negative for weakness and numbness. Physical Exam     Visit Vitals  BP (!) 191/127 (BP 1 Location: Left arm, BP Patient Position: At rest)   Pulse 94   Temp 97.3 °F (36.3 °C)   Resp 16   SpO2 98%         Physical Exam   Constitutional: He is oriented to person, place, and time. He appears well-developed and well-nourished. HENT:   Head: Normocephalic and atraumatic. Eyes: Conjunctivae are normal.   Neck: Normal range of motion. Cardiovascular: Normal rate and intact distal pulses. Pulmonary/Chest: Effort normal.   Abdominal: He exhibits no distension. Musculoskeletal: Normal range of motion. He exhibits no deformity. Pain primarily with active abduction of the left shoulder. Some minimal discomfort with flexion or external rotation. Minimal pain with any passive range of motion. Mild tenderness palpated at the insertion of the deltoid onto the humerus, with no other palpable tenderness, masses or other abnormalities. Neurological: He is alert and oriented to person, place, and time. Normal strength, sensation   Skin: Skin is warm and dry. No rash noted. Psychiatric: He has a normal mood and affect. His behavior is normal.   Nursing note and vitals reviewed.         Diagnostic Study Results     Labs -  Recent Results (from the past 12 hour(s)) EKG, 12 LEAD, INITIAL    Collection Time: 03/18/19  5:47 PM   Result Value Ref Range    Ventricular Rate 84 BPM    Atrial Rate 84 BPM    P-R Interval 130 ms    QRS Duration 76 ms    Q-T Interval 366 ms    QTC Calculation (Bezet) 432 ms    Calculated P Axis 42 degrees    Calculated R Axis 29 degrees    Calculated T Axis 55 degrees    Diagnosis       Normal sinus rhythm  Normal ECG  When compared with ECG of 26-SEP-2017 01:59,  No significant change was found         Radiologic Studies -   XR SHOULDER LT AP/LAT MIN 2 V    (Results Pending)         Medical Decision Making   I am the first provider for this patient. I reviewed the vital signs, available nursing notes, past medical history, past surgical history, family history and social history. Vital Signs-Reviewed the patient's vital signs. Records Reviewed: Nursing Notes (Time of Review: 9:17 PM)    ED Course: Progress Notes, Reevaluation, and Consults:    Provider Notes (Medical Decision Making):   Jennifer Galicia is a 37 y.o. male with No significant past medical history who presents with complaint of 2 days of left shoulder pain. He states that he woke up with this on Sunday morning and had assumed that he had slept on it wrong. He denies any injury. 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 He denies any weakness, numbness, tingling of that arm. Denies any other known injury. The pain is worse with the arm fully abducted. No other obvious injury, palpable deformity or neurovascular deficit. Differential Diagnosis: Primarily suspect muscular pain of the left deltoid, primarily around the insertion onto the humerus. Do not suspect fracture, cardiovascular abnormality. Testing: None further indicated tonight  Treatments: Tylenol, ibuprofen.   Conservative management recommended with outpatient follow-up, and possibly physical therapy if not improved over the next couple of weeks.      Diagnosis     Clinical Impression:   1. Acute pain of left shoulder        Disposition: Discharge    Follow-up Information     Follow up With Specialties Details Why [facility]    [facility] EMERGENCY DEPT Emergency Medicine  If symptoms worsen [address] [id number]  [phone]              Medication List      ASK your doctor about these medications    amLODIPine 10 mg tablet  Commonly known as:  NORVASC  Take 1 Tab by mouth daily. cloNIDine HCl 0.1 mg tablet  Commonly known as:  CATAPRES  Take 1 Tab by mouth two (2) times a day. neomycin-polymyxin-hydrocortisone otic solution  Commonly known as:  CORTISPORIN  Administer 3-4 Drops in right ear four (4) times daily. For 10 days     * oxyCODONE-acetaminophen 5-325 mg per tablet  Commonly known as:  PERCOCET  Take 1 Tab by mouth every four (4) hours as needed. Max Daily Amount: 6 Tabs. * oxyCODONE-acetaminophen 5-325 mg per tablet  Commonly known as:  PERCOCET  Take 1 Tab by mouth every four (4) hours as needed for Pain. Max Daily Amount: 6 Tabs. Indications: Pain         * This list has 2 medication(s) that are the same as other medications prescribed for you. Read the directions carefully, and ask your doctor or other care provider to review them with you.

## 2019-03-19 NOTE — DISCHARGE INSTRUCTIONS
Patient Education     Musculoskeletal Pain: Care Instructions  Your Care Instructions  Different problems with the bones, muscles, nerves, ligaments, and tendons in the body can cause pain. One or more areas of your body may ache or burn. Or they may feel tired, stiff, or sore. The medical term for this type of pain is musculoskeletal pain. It can have many different causes. Sometimes the pain is caused by an injury such as a strain or sprain. Or you might have pain from using one part of your body in the same way over and over again. This is called overuse. In some cases, the cause of the pain is another health problem such as arthritis or fibromyalgia. The doctor will examine you and ask you questions about your health to help find the cause of your pain. Blood tests or imaging tests like an X-ray may also be helpful. But sometimes doctors can't find a cause of the pain. Treatment depends on your symptoms and the cause of the pain, if known. The doctor has checked you carefully, but problems can develop later. If you notice any problems or new symptoms, get medical treatment right away. Follow-up care is a key part of your treatment and safety. Be sure to make and go to all appointments, and call your doctor if you are having problems. It's also a good idea to know your test results and keep a list of the medicines you take. How can you care for yourself at home? · Rest until you feel better. · Do not do anything that makes the pain worse. Return to exercise gradually if you feel better and your doctor says it's okay. · Be safe with medicines. Read and follow all instructions on the label. ¨ If the doctor gave you a prescription medicine for pain, take it as prescribed. ¨ If you are not taking a prescription pain medicine, ask your doctor if you can take an over-the-counter medicine. · Put ice or a cold pack on the area for 10 to 20 minutes at a time to ease pain.  Put a thin cloth between the ice and your skin. When should you call for help? Call your doctor now or seek immediate medical care if:  · You have new pain, or your pain gets worse. · You have new symptoms such as a fever, a rash, or chills. Watch closely for changes in your health, and be sure to contact your doctor if:  · You do not get better as expected. Where can you learn more? Go to [employer]  Enter Q624 in the search box to learn more about \"Musculoskeletal Pain: Care Instructions. \"   © 6198-2782 Healthwise, Incorporated. Care instructions adapted under license by [facility] (which disclaims liability or warranty for this information). This care instruction is for use with your licensed healthcare professional. If you have questions about a medical condition or this instruction, always ask your healthcare professional. [address] any warranty or liability for your use of this information.   Content Version: 70.8.638118; Current as of: November 20, 2015

## 2019-11-13 ENCOUNTER — HOSPITAL ENCOUNTER (EMERGENCY)
Age: 44
Discharge: LEFT BEFORE DISCHARGE | End: 2019-11-13
Attending: EMERGENCY MEDICINE
Payer: SELF-PAY

## 2019-11-13 VITALS
SYSTOLIC BLOOD PRESSURE: 182 MMHG | DIASTOLIC BLOOD PRESSURE: 119 MMHG | OXYGEN SATURATION: 100 % | RESPIRATION RATE: 16 BRPM | TEMPERATURE: 97.3 F | BODY MASS INDEX: 28.66 KG/M2 | HEART RATE: 81 BPM | WEIGHT: 172 LBS | HEIGHT: 65 IN

## 2019-11-13 PROCEDURE — 75810000275 HC EMERGENCY DEPT VISIT NO LEVEL OF CARE

## 2019-11-13 NOTE — ED NOTES
Patient to triage with abd pain. Symptoms started a couple of weeks ago. Pt denies N/VD. The pt stated he has tried Aleve and Motrin. The pt stated he took some of his friend's Percocet and it helped the pain. The pt was dx with a hernia at [facility] a couple of weeks ago. The pt stated he does not have a PCP to get his BP medications refilled.

## 2022-03-19 PROBLEM — N17.9 ARF (ACUTE RENAL FAILURE) (HCC): Status: ACTIVE | Noted: 2017-09-26

## 2022-03-19 PROBLEM — L03.115 CELLULITIS OF RIGHT KNEE: Status: ACTIVE | Noted: 2017-09-26

## 2022-03-19 PROBLEM — M00.9 SEPTIC ARTHRITIS OF KNEE, RIGHT (HCC): Status: ACTIVE | Noted: 2017-09-25

## 2022-03-19 PROBLEM — I10 HTN (HYPERTENSION): Status: ACTIVE | Noted: 2017-09-26

## 2022-10-02 ENCOUNTER — HOSPITAL ENCOUNTER (EMERGENCY)
Age: 47
Discharge: LEFT BEFORE DISCHARGE | End: 2022-10-02
Attending: EMERGENCY MEDICINE

## 2022-10-02 VITALS
SYSTOLIC BLOOD PRESSURE: 183 MMHG | HEIGHT: 66 IN | OXYGEN SATURATION: 99 % | WEIGHT: 160 LBS | BODY MASS INDEX: 25.71 KG/M2 | RESPIRATION RATE: 18 BRPM | HEART RATE: 99 BPM | TEMPERATURE: 98.2 F | DIASTOLIC BLOOD PRESSURE: 102 MMHG

## 2022-10-02 PROCEDURE — 75810000275 HC EMERGENCY DEPT VISIT NO LEVEL OF CARE

## 2022-10-03 NOTE — ED TRIAGE NOTES
Pt arrived in the ER with a CO of a headache for the past week. He stated it is now affecting his sleep.

## 2023-02-21 ENCOUNTER — HOSPITAL ENCOUNTER (EMERGENCY)
Facility: HOSPITAL | Age: 48
Discharge: HOME | End: 2023-02-21
Attending: EMERGENCY MEDICINE

## 2023-02-21 VITALS
HEIGHT: 65 IN | TEMPERATURE: 98.3 F | WEIGHT: 168 LBS | DIASTOLIC BLOOD PRESSURE: 114 MMHG | SYSTOLIC BLOOD PRESSURE: 182 MMHG | RESPIRATION RATE: 16 BRPM | BODY MASS INDEX: 27.99 KG/M2 | OXYGEN SATURATION: 97 % | HEART RATE: 86 BPM

## 2023-02-21 DIAGNOSIS — R60.0 LEG EDEMA: Primary | ICD-10-CM

## 2023-02-21 LAB
ALBUMIN SERPL-MCNC: 3.6 G/DL (ref 3.4–5)
ALBUMIN/GLOB SERPL: 1.2 (ref 0.8–1.7)
ALP SERPL-CCNC: 65 U/L (ref 45–117)
ALT SERPL-CCNC: 21 U/L (ref 16–61)
ANION GAP SERPL CALC-SCNC: 4 MMOL/L (ref 3–18)
AST SERPL-CCNC: 21 U/L (ref 10–38)
BASOPHILS # BLD: 0.1 K/UL (ref 0–0.1)
BASOPHILS NFR BLD: 1 % (ref 0–2)
BILIRUB SERPL-MCNC: 0.4 MG/DL (ref 0.2–1)
BUN SERPL-MCNC: 12 MG/DL (ref 7–18)
BUN/CREAT SERPL: 12 (ref 12–20)
CALCIUM SERPL-MCNC: 9.1 MG/DL (ref 8.5–10.1)
CHLORIDE SERPL-SCNC: 108 MMOL/L (ref 100–111)
CO2 SERPL-SCNC: 29 MMOL/L (ref 21–32)
CREAT SERPL-MCNC: 1.03 MG/DL (ref 0.6–1.3)
D DIMER PPP FEU-MCNC: <0.27 UG/ML(FEU)
DIFFERENTIAL METHOD BLD: ABNORMAL
EOSINOPHIL # BLD: 0.2 K/UL (ref 0–0.4)
EOSINOPHIL NFR BLD: 2 % (ref 0–5)
ERYTHROCYTE [DISTWIDTH] IN BLOOD BY AUTOMATED COUNT: 14.4 % (ref 11.6–14.5)
GLOBULIN SER CALC-MCNC: 2.9 G/DL (ref 2–4)
GLUCOSE SERPL-MCNC: 121 MG/DL (ref 74–99)
HCT VFR BLD AUTO: 37.8 % (ref 36–48)
HGB BLD-MCNC: 12.6 G/DL (ref 13–16)
IMM GRANULOCYTES # BLD AUTO: 0 K/UL (ref 0–0.04)
IMM GRANULOCYTES NFR BLD AUTO: 0 % (ref 0–0.5)
LYMPHOCYTES # BLD: 2.8 K/UL (ref 0.9–3.6)
LYMPHOCYTES NFR BLD: 33 % (ref 21–52)
MCH RBC QN AUTO: 31.7 PG (ref 24–34)
MCHC RBC AUTO-ENTMCNC: 33.3 G/DL (ref 31–37)
MCV RBC AUTO: 95.2 FL (ref 78–100)
MONOCYTES # BLD: 0.6 K/UL (ref 0.05–1.2)
MONOCYTES NFR BLD: 7 % (ref 3–10)
NEUTS SEG # BLD: 4.8 K/UL (ref 1.8–8)
NEUTS SEG NFR BLD: 57 % (ref 40–73)
NRBC # BLD: 0 K/UL (ref 0–0.01)
NRBC BLD-RTO: 0 PER 100 WBC
NT PRO BNP: 111 PG/ML (ref 0–450)
PLATELET # BLD AUTO: 342 K/UL (ref 135–420)
PMV BLD AUTO: 10.4 FL (ref 9.2–11.8)
POTASSIUM SERPL-SCNC: 3.6 MMOL/L (ref 3.5–5.5)
PROT SERPL-MCNC: 6.5 G/DL (ref 6.4–8.2)
RBC # BLD AUTO: 3.97 M/UL (ref 4.35–5.65)
SODIUM SERPL-SCNC: 141 MMOL/L (ref 136–145)
TROPONIN I SERPL HS-MCNC: 7 NG/L (ref 0–78)
WBC # BLD AUTO: 8.4 K/UL (ref 4.6–13.2)

## 2023-02-21 PROCEDURE — 99283 EMERGENCY DEPT VISIT LOW MDM: CPT | Performed by: EMERGENCY MEDICINE

## 2023-02-21 PROCEDURE — 83880 ASSAY OF NATRIURETIC PEPTIDE: CPT

## 2023-02-21 PROCEDURE — 85379 FIBRIN DEGRADATION QUANT: CPT

## 2023-02-21 PROCEDURE — 84484 ASSAY OF TROPONIN QUANT: CPT

## 2023-02-21 PROCEDURE — 85025 COMPLETE CBC W/AUTO DIFF WBC: CPT

## 2023-02-21 PROCEDURE — 80053 COMPREHEN METABOLIC PANEL: CPT

## 2023-02-21 RX ORDER — FUROSEMIDE 20 MG/1
20 TABLET ORAL 2 TIMES DAILY
Qty: 10 TABLET | Refills: 0 | Status: SHIPPED | OUTPATIENT
Start: 2023-02-21 | End: 2023-02-21 | Stop reason: SDUPTHER

## 2023-02-21 RX ORDER — OXYCODONE HYDROCHLORIDE AND ACETAMINOPHEN 5; 325 MG/1; MG/1
1 TABLET ORAL EVERY 8 HOURS PRN
Qty: 9 TABLET | Refills: 0 | Status: SHIPPED | OUTPATIENT
Start: 2023-02-21 | End: 2023-02-21 | Stop reason: SDUPTHER

## 2023-02-21 RX ORDER — OXYCODONE HYDROCHLORIDE AND ACETAMINOPHEN 5; 325 MG/1; MG/1
1 TABLET ORAL EVERY 8 HOURS PRN
Qty: 9 TABLET | Refills: 0 | Status: SHIPPED | OUTPATIENT
Start: 2023-02-21 | End: 2023-02-24

## 2023-02-21 RX ORDER — FUROSEMIDE 20 MG/1
20 TABLET ORAL 2 TIMES DAILY
Qty: 10 TABLET | Refills: 0 | Status: SHIPPED | OUTPATIENT
Start: 2023-02-21 | End: 2023-02-26

## 2023-02-21 ASSESSMENT — ENCOUNTER SYMPTOMS
SHORTNESS OF BREATH: 0
EYES NEGATIVE: 1
ABDOMINAL PAIN: 0

## 2023-02-21 NOTE — ED PROVIDER NOTES
[facility] EMERGENCY DEPT  EMERGENCY DEPARTMENT ENCOUNTER      Pt Name: Graciela Rivero  MRN: [id number]  [place] 1975  Date of evaluation: 2/21/2023  Provider: Devon Mensah, [address] [place] [address] [place]       Chief Complaint   Patient presents with    Leg Swelling         HISTORY OF PRESENT ILLNESS   (Location/Symptom, Timing/Onset, Context/Setting, Quality, Duration, Modifying Factors, Severity)  Note limiting factors. Graciela Rivero is a 52 y.o. male who presents to the emergency department c/o BLE edema. Patient said he took Percocet from a friend for the pain. Denies a history of DVTs CHF. Denies chest pain shortness of breath trauma scrotal swelling or alcohol abuse. Denies prior similar episodes of leg swelling. Color changes open wounds. Denies any trauma. HPI    Nursing Notes were reviewed. REVIEW OF SYSTEMS    (2-9 systems for level 4, 10 or more for level 5)     Review of Systems   Constitutional:  Negative for fever. HENT: Negative. Eyes: Negative. Respiratory:  Negative for shortness of breath. Cardiovascular:  Negative for chest pain. Gastrointestinal:  Negative for abdominal pain. Endocrine: Negative. Genitourinary: Negative. Negative for scrotal swelling. Musculoskeletal:  Positive for myalgias. Allergic/Immunologic: Negative for immunocompromised state. Psychiatric/Behavioral: Negative. Except as noted above the remainder of the review of systems was reviewed and negative. PAST MEDICAL HISTORY     Past Medical History:   Diagnosis Date    Hypertension          SURGICAL HISTORY       Past Surgical History:   Procedure Laterality Date    OTHER SURGICAL HISTORY      abscess wash out on right knee         CURRENT MEDICATIONS       Previous Medications    No medications on file       ALLERGIES     Patient has no known allergies. FAMILY HISTORY     No family history on file.        SOCIAL HISTORY       Social History     Socioeconomic History    Marital status: Unknown Tobacco Use    Smoking status: Every Day     Packs/day: 0.50     Types: Cigarettes     Last attempt to quit: 10/5/2017     Years since quittin.3    Smokeless tobacco: Never   Substance and Sexual Activity    Alcohol use: Yes    Drug use: No       SCREENINGS         Gadiel Coma Scale  Eye Opening: Spontaneous  Best Verbal Response: Oriented  Best Motor Response: Obeys commands  [place] Coma Scale Score: 15                     CIWA Assessment  BP: (!) 182/114  Heart Rate: 86                 PHYSICAL EXAM    (up to 7 for level 4, 8 or more for level 5)     ED Triage Vitals [23 1634]   BP Temp Temp Source Heart Rate Resp SpO2 Height Weight   (!) 182/114 98.3 °F (36.8 °C) Oral 86 16 97 % 5' 5\" (1.651 m) 168 lb (76.2 kg)       Physical Exam  Vitals and nursing note reviewed. Constitutional:       General: He is not in acute distress. Appearance: Normal appearance. He is normal weight. He is not ill-appearing, toxic-appearing or diaphoretic. HENT:      Head: Normocephalic and atraumatic. Nose: Nose normal.      Mouth/Throat:      Mouth: Mucous membranes are moist.   Eyes:      Extraocular Movements: Extraocular movements intact. Cardiovascular:      Rate and Rhythm: Normal rate and regular rhythm. Pulses: Normal pulses. Heart sounds: Normal heart sounds. Pulmonary:      Effort: Pulmonary effort is normal.      Breath sounds: Normal breath sounds. No wheezing or rales. Abdominal:      General: Abdomen is flat. Palpations: Abdomen is soft. Tenderness: There is no abdominal tenderness. Musculoskeletal:         General: No deformity or signs of injury. Normal range of motion. Cervical back: Normal range of motion and neck supple. No rigidity. Right lower leg: Edema present. Left lower leg: Edema present. Comments: Mild bilateral lower extremity edema, 2+ at most.  DP PT pulses palpable. Less swelling above the knees. No open wounds or redness. Skin:     General: Skin is warm. Neurological:      Mental Status: He is alert and oriented to person, place, and time. Cranial Nerves: No cranial nerve deficit. Sensory: No sensory deficit. Motor: No weakness. Coordination: Coordination normal.      Gait: Gait normal.   Psychiatric:         Mood and Affect: Mood normal.         Behavior: Behavior normal.         Thought Content: Thought content normal.         Judgment: Judgment normal.       DIAGNOSTIC RESULTS     EKG: All EKG's are interpreted by the Emergency Department Physician who either signs or Co-signs this chart in the absence of a cardiologist.      RADIOLOGY:   Non-plain film images such as CT, Ultrasound and MRI are read by the radiologist. Plain radiographic images are visualized and preliminarily interpreted by the emergency physician with the below findings:  Interpretation per the Radiologist below, if available at the time of this note:    No orders to display         ED BEDSIDE ULTRASOUND:   Performed by ED Physician - none    LABS:  Labs Reviewed   CBC WITH AUTO DIFFERENTIAL - Abnormal; Notable for the following components:       Result Value    RBC 3.97 (*)     Hemoglobin 12.6 (*)     All other components within normal limits   COMPREHENSIVE METABOLIC PANEL - Abnormal; Notable for the following components:    Glucose 121 (*)     All other components within normal limits   D-DIMER, QUANTITATIVE   BRAIN NATRIURETIC PEPTIDE   TROPONIN       All other labs were within normal range or not returned as of this dictation. EMERGENCY DEPARTMENT COURSE and DIFFERENTIAL DIAGNOSIS/MDM:   Vitals:    Vitals:    02/21/23 1634   BP: (!) 182/114   Pulse: 86   Resp: 16   Temp: 98.3 °F (36.8 °C)   TempSrc: Oral   SpO2: 97%   Weight: 168 lb (76.2 kg)   Height: 5' 5\" (1.651 m)         Medical Decision Making  Amount and/or Complexity of Data Reviewed  Labs: ordered. Risk  Prescription drug management.     Patient has lower extremity edema mild treat with Lasix and pain control. D-dimer negative. Advised to return in a week for retesting if nonresolution of symptoms. Advised to return for any sudden chest pain or shortness of breath immediately. REASSESSMENT          FINAL IMPRESSION      1. Leg edema          DISPOSITION/PLAN   DISPOSITION Decision To Discharge 02/21/2023 06:25:07 PM      PATIENT REFERRED TO:  Osman Moeller [address]  Schedule an appointment as soon as possible for a visit in 1 day      [facility] EMERGENCY DEPT  [id number]  [phone]    If symptoms worsen return immediately    DISCHARGE MEDICATIONS:  New Prescriptions    FUROSEMIDE (LASIX) 20 MG TABLET    Take 1 tablet by mouth 2 times daily for 5 days    OXYCODONE-ACETAMINOPHEN (PERCOCET) 5-325 MG PER TABLET    Take 1 tablet by mouth every 8 hours as needed for Pain for up to 3 days. Intended supply: 3 days. Take lowest dose possible to manage pain Max Daily Amount: 3 tablets     Controlled Substances Monitoring:     No flowsheet data found.     (Please note that portions of this note were completed with a voice recognition program.  Efforts were made to edit the dictations but occasionally words are mis-transcribed.)    SOLITARIO Jean (electronically signed)  Attending Emergency Physician            Matthew Gonzalez  02/21/23 [id number]

## 2023-02-21 NOTE — ED NOTES
Pt discharged to home. Verbalized understanding of discharge instructions.       Mari Ivory RN  02/21/23 [id number]

## 2023-05-12 PROBLEM — I10 HYPERTENSION: Status: ACTIVE | Noted: 2023-05-12

## 2023-06-05 ENCOUNTER — HOSPITAL ENCOUNTER (EMERGENCY)
Facility: HOSPITAL | Age: 48
Discharge: HOME | End: 2023-06-05
Attending: EMERGENCY MEDICINE
Payer: COMMERCIAL

## 2023-06-05 VITALS
HEIGHT: 65 IN | WEIGHT: 165 LBS | SYSTOLIC BLOOD PRESSURE: 177 MMHG | TEMPERATURE: 97.3 F | HEART RATE: 88 BPM | OXYGEN SATURATION: 99 % | BODY MASS INDEX: 27.49 KG/M2 | RESPIRATION RATE: 16 BRPM | DIASTOLIC BLOOD PRESSURE: 126 MMHG

## 2023-06-05 DIAGNOSIS — Z76.0 ENCOUNTER FOR MEDICATION REFILL: Primary | ICD-10-CM

## 2023-06-05 DIAGNOSIS — M79.662 BILATERAL CALF PAIN: ICD-10-CM

## 2023-06-05 DIAGNOSIS — M79.661 BILATERAL CALF PAIN: ICD-10-CM

## 2023-06-05 LAB
ANION GAP SERPL CALC-SCNC: 3 MMOL/L (ref 3–18)
BUN SERPL-MCNC: 10 MG/DL (ref 7–18)
BUN/CREAT SERPL: 10 (ref 12–20)
CALCIUM SERPL-MCNC: 9.3 MG/DL (ref 8.5–10.1)
CHLORIDE SERPL-SCNC: 109 MMOL/L (ref 100–111)
CO2 SERPL-SCNC: 28 MMOL/L (ref 21–32)
CREAT SERPL-MCNC: 1.02 MG/DL (ref 0.6–1.3)
D DIMER PPP FEU-MCNC: 0.27 UG/ML(FEU)
GLUCOSE SERPL-MCNC: 109 MG/DL (ref 74–99)
POTASSIUM SERPL-SCNC: 3.8 MMOL/L (ref 3.5–5.5)
SODIUM SERPL-SCNC: 140 MMOL/L (ref 136–145)

## 2023-06-05 PROCEDURE — 99283 EMERGENCY DEPT VISIT LOW MDM: CPT

## 2023-06-05 PROCEDURE — 85379 FIBRIN DEGRADATION QUANT: CPT

## 2023-06-05 PROCEDURE — 80048 BASIC METABOLIC PNL TOTAL CA: CPT

## 2023-06-05 PROCEDURE — 6370000000 HC RX 637 (ALT 250 FOR IP): Performed by: EMERGENCY MEDICINE

## 2023-06-05 RX ORDER — OXYCODONE HYDROCHLORIDE AND ACETAMINOPHEN 5; 325 MG/1; MG/1
1 TABLET ORAL
Status: COMPLETED | OUTPATIENT
Start: 2023-06-05 | End: 2023-06-05

## 2023-06-05 RX ORDER — AMLODIPINE BESYLATE 10 MG/1
10 TABLET ORAL DAILY
Qty: 90 TABLET | Refills: 1 | Status: SHIPPED | OUTPATIENT
Start: 2023-06-05

## 2023-06-05 RX ORDER — AMLODIPINE BESYLATE 10 MG/1
10 TABLET ORAL
Status: COMPLETED | OUTPATIENT
Start: 2023-06-05 | End: 2023-06-05

## 2023-06-05 RX ORDER — OXYCODONE HYDROCHLORIDE AND ACETAMINOPHEN 5; 325 MG/1; MG/1
1 TABLET ORAL EVERY 8 HOURS PRN
Qty: 9 TABLET | Refills: 0 | Status: SHIPPED | OUTPATIENT
Start: 2023-06-05 | End: 2023-06-08

## 2023-06-05 RX ADMIN — OXYCODONE HYDROCHLORIDE AND ACETAMINOPHEN 1 TABLET: 5; 325 TABLET ORAL at 18:48

## 2023-06-05 RX ADMIN — AMLODIPINE BESYLATE 10 MG: 10 TABLET ORAL at 18:48

## 2023-06-05 ASSESSMENT — ENCOUNTER SYMPTOMS
SHORTNESS OF BREATH: 0
NAUSEA: 0

## 2023-06-05 NOTE — ED TRIAGE NOTES
Patient presents to the ED for for medication refill of amlodipine. Patient states that he is scheduled to see his pcp on June 8th, however he has ran out of the meds. Pt also complaining of bilateral leg pain. Pt states that he was evaluated for the leg pain already and was prescribed 800 ibuprofen. Pt states he wants something stronger.

## 2023-06-05 NOTE — ED PROVIDER NOTES
[facility] EMERGENCY DEPT  EMERGENCY DEPARTMENT ENCOUNTER      Pt Name: Selina Pollock  MRN: [id number]  [place] 1975  Date of evaluation: 6/5/2023  Provider: Elise Mcardle, PA    CHIEF COMPLAINT       Chief Complaint   Patient presents with    Medication Refill         HISTORY OF PRESENT ILLNESS   (Location/Symptom, Timing/Onset, Context/Setting, Quality, Duration, Modifying Factors, Severity)  Note limiting factors. Selina Pollock is a 52 y.o. male who presents to the emergency department complaint of needing his prescription for amlodipine refilled. He has been out of it for several days now. He denies any chest pain or shortness of breath or headache numbness weakness lightheadedness or dizziness. He is however complaining of bilateral calf pain; used to have worse swelling says it is improved but is still having pain taking ibuprofen without relief. Denies history of DVT. HPI    Nursing Notes were reviewed. REVIEW OF SYSTEMS    (2-9 systems for level 4, 10 or more for level 5)     Review of Systems   Respiratory:  Negative for shortness of breath. Cardiovascular:  Positive for palpitations. Negative for chest pain. Gastrointestinal:  Negative for nausea. Musculoskeletal:  Positive for myalgias. Neurological:  Negative for dizziness, syncope, weakness, light-headedness, numbness and headaches. Except as noted above the remainder of the review of systems was reviewed and negative.        PAST MEDICAL HISTORY     Past Medical History:   Diagnosis Date    Hypertension          SURGICAL HISTORY       Past Surgical History:   Procedure Laterality Date    OTHER SURGICAL HISTORY      abscess wash out on right knee         CURRENT MEDICATIONS       Previous Medications    AMLODIPINE (NORVASC) 10 MG TABLET    Take 10 mg by mouth daily    CLONIDINE (CATAPRES) 0.1 MG TABLET    Take 0.1 mg by mouth 2 times daily    FUROSEMIDE (LASIX) 20 MG TABLET    Take 1 tablet by mouth 2 times daily for 5 days    IBUPROFEN

## 2023-08-02 ENCOUNTER — HOSPITAL ENCOUNTER (EMERGENCY)
Facility: HOSPITAL | Age: 48
Discharge: HOME | End: 2023-08-02
Attending: STUDENT IN AN ORGANIZED HEALTH CARE EDUCATION/TRAINING PROGRAM
Payer: COMMERCIAL

## 2023-08-02 VITALS
SYSTOLIC BLOOD PRESSURE: 153 MMHG | TEMPERATURE: 97.9 F | RESPIRATION RATE: 16 BRPM | OXYGEN SATURATION: 100 % | DIASTOLIC BLOOD PRESSURE: 96 MMHG | HEART RATE: 74 BPM

## 2023-08-02 DIAGNOSIS — M79.605 PAIN IN BOTH LOWER EXTREMITIES: ICD-10-CM

## 2023-08-02 DIAGNOSIS — R60.9 PERIPHERAL EDEMA: Primary | ICD-10-CM

## 2023-08-02 DIAGNOSIS — M79.604 PAIN IN BOTH LOWER EXTREMITIES: ICD-10-CM

## 2023-08-02 LAB
ALBUMIN SERPL-MCNC: 3.1 G/DL (ref 3.4–5)
ALBUMIN/GLOB SERPL: 1.1 (ref 0.8–1.7)
ALP SERPL-CCNC: 58 U/L (ref 45–117)
ALT SERPL-CCNC: 23 U/L (ref 16–61)
ANION GAP SERPL CALC-SCNC: 2 MMOL/L (ref 3–18)
AST SERPL-CCNC: 11 U/L (ref 10–38)
BASOPHILS # BLD: 0 K/UL (ref 0–0.1)
BASOPHILS NFR BLD: 1 % (ref 0–2)
BILIRUB SERPL-MCNC: 0.2 MG/DL (ref 0.2–1)
BUN SERPL-MCNC: 13 MG/DL (ref 7–18)
BUN/CREAT SERPL: 14 (ref 12–20)
CALCIUM SERPL-MCNC: 8.5 MG/DL (ref 8.5–10.1)
CHLORIDE SERPL-SCNC: 111 MMOL/L (ref 100–111)
CO2 SERPL-SCNC: 27 MMOL/L (ref 21–32)
CREAT SERPL-MCNC: 0.92 MG/DL (ref 0.6–1.3)
D DIMER PPP FEU-MCNC: <0.27 UG/ML(FEU)
DIFFERENTIAL METHOD BLD: ABNORMAL
EOSINOPHIL # BLD: 0.2 K/UL (ref 0–0.4)
EOSINOPHIL NFR BLD: 2 % (ref 0–5)
ERYTHROCYTE [DISTWIDTH] IN BLOOD BY AUTOMATED COUNT: 14.4 % (ref 11.6–14.5)
GLOBULIN SER CALC-MCNC: 2.7 G/DL (ref 2–4)
GLUCOSE SERPL-MCNC: 96 MG/DL (ref 74–99)
HCT VFR BLD AUTO: 38.6 % (ref 36–48)
HGB BLD-MCNC: 12.9 G/DL (ref 13–16)
IMM GRANULOCYTES # BLD AUTO: 0 K/UL (ref 0–0.04)
IMM GRANULOCYTES NFR BLD AUTO: 0 % (ref 0–0.5)
LYMPHOCYTES # BLD: 2.7 K/UL (ref 0.9–3.6)
LYMPHOCYTES NFR BLD: 32 % (ref 21–52)
MCH RBC QN AUTO: 31.9 PG (ref 24–34)
MCHC RBC AUTO-ENTMCNC: 33.4 G/DL (ref 31–37)
MCV RBC AUTO: 95.3 FL (ref 78–100)
MONOCYTES # BLD: 0.4 K/UL (ref 0.05–1.2)
MONOCYTES NFR BLD: 5 % (ref 3–10)
NEUTS SEG # BLD: 5.1 K/UL (ref 1.8–8)
NEUTS SEG NFR BLD: 61 % (ref 40–73)
NRBC # BLD: 0 K/UL (ref 0–0.01)
NRBC BLD-RTO: 0 PER 100 WBC
NT PRO BNP: 40 PG/ML (ref 0–450)
PLATELET # BLD AUTO: 253 K/UL (ref 135–420)
PMV BLD AUTO: 10.1 FL (ref 9.2–11.8)
POTASSIUM SERPL-SCNC: 3.6 MMOL/L (ref 3.5–5.5)
PROT SERPL-MCNC: 5.8 G/DL (ref 6.4–8.2)
RBC # BLD AUTO: 4.05 M/UL (ref 4.35–5.65)
SODIUM SERPL-SCNC: 140 MMOL/L (ref 136–145)
WBC # BLD AUTO: 8.3 K/UL (ref 4.6–13.2)

## 2023-08-02 PROCEDURE — 85025 COMPLETE CBC W/AUTO DIFF WBC: CPT

## 2023-08-02 PROCEDURE — 83880 ASSAY OF NATRIURETIC PEPTIDE: CPT

## 2023-08-02 PROCEDURE — 96374 THER/PROPH/DIAG INJ IV PUSH: CPT

## 2023-08-02 PROCEDURE — 6360000002 HC RX W HCPCS: Performed by: NURSE PRACTITIONER

## 2023-08-02 PROCEDURE — 85379 FIBRIN DEGRADATION QUANT: CPT

## 2023-08-02 PROCEDURE — 99284 EMERGENCY DEPT VISIT MOD MDM: CPT

## 2023-08-02 PROCEDURE — 93005 ELECTROCARDIOGRAM TRACING: CPT | Performed by: NURSE PRACTITIONER

## 2023-08-02 PROCEDURE — 80053 COMPREHEN METABOLIC PANEL: CPT

## 2023-08-02 PROCEDURE — 6370000000 HC RX 637 (ALT 250 FOR IP): Performed by: NURSE PRACTITIONER

## 2023-08-02 RX ORDER — FUROSEMIDE 20 MG/1
20 TABLET ORAL
Status: COMPLETED | OUTPATIENT
Start: 2023-08-02 | End: 2023-08-02

## 2023-08-02 RX ORDER — IBUPROFEN 800 MG/1
800 TABLET ORAL 2 TIMES DAILY PRN
Qty: 30 TABLET | Refills: 0 | Status: SHIPPED | OUTPATIENT
Start: 2023-08-02

## 2023-08-02 RX ORDER — OXYCODONE HYDROCHLORIDE AND ACETAMINOPHEN 5; 325 MG/1; MG/1
1 TABLET ORAL
Status: COMPLETED | OUTPATIENT
Start: 2023-08-02 | End: 2023-08-02

## 2023-08-02 RX ORDER — FUROSEMIDE 20 MG/1
20 TABLET ORAL DAILY
Qty: 5 TABLET | Refills: 0 | Status: SHIPPED | OUTPATIENT
Start: 2023-08-02

## 2023-08-02 RX ORDER — KETOROLAC TROMETHAMINE 15 MG/ML
15 INJECTION, SOLUTION INTRAMUSCULAR; INTRAVENOUS ONCE
Status: COMPLETED | OUTPATIENT
Start: 2023-08-02 | End: 2023-08-02

## 2023-08-02 RX ADMIN — OXYCODONE AND ACETAMINOPHEN 1 TABLET: 325; 5 TABLET ORAL at 19:02

## 2023-08-02 RX ADMIN — KETOROLAC TROMETHAMINE 15 MG: 15 INJECTION, SOLUTION INTRAMUSCULAR; INTRAVENOUS at 18:13

## 2023-08-02 RX ADMIN — FUROSEMIDE 20 MG: 20 TABLET ORAL at 18:12

## 2023-08-02 ASSESSMENT — PAIN - FUNCTIONAL ASSESSMENT: PAIN_FUNCTIONAL_ASSESSMENT: NONE - DENIES PAIN

## 2023-08-02 ASSESSMENT — PAIN SCALES - GENERAL: PAINLEVEL_OUTOF10: 9

## 2023-08-02 ASSESSMENT — ENCOUNTER SYMPTOMS
GASTROINTESTINAL NEGATIVE: 1
EYES NEGATIVE: 1
RESPIRATORY NEGATIVE: 1

## 2023-08-02 NOTE — ED PROVIDER NOTES
will not receive a narcotic as a prescription for lower extremity edema. Recommend patient has compression stockings elevate lower extremities and follow-up with PCP. CBC within normal limits   CMP stable   BNP within normal limits at 40   D-dimer within normal limits at 0.27. Pt has been reexamined. Patient has no new complaints, or physical findings. Care plan outlined and precautions discussed. Results were reviewed with the patient. All medications were reviewed with the patient. All of pt's questions and concerns were addressed. Alarm symptoms and return precautions associated with chief complaint and evaluation were reviewed with the patient in detail. The patient demonstrated adequate understanding. Patient was instructed to follow up with PCP in 2 days for reassessment, as well as given strict return precautions to the ED upon further deterioration. Patient is ready for discharge home. Diagnosis     Clinical Impression:   1. Peripheral edema    2. Pain in both lower extremities        Disposition: home      [facility] EM[facility]PT  [address] [facility]  [phone]    If symptoms worsen    None None    In 2 days            Medication List        CHANGE how you take these medications      * furosemide 20 MG tablet  Commonly known as: Lasix  Take 1 tablet by mouth 2 times daily for 5 days  What changed: Another medication with the same name was added. Make sure you understand how and when to take each. * furosemide 20 MG tablet  Commonly known as: LASIX  Take 1 tablet by mouth daily  What changed: You were already taking a medication with the same name, and this prescription was added. Make sure you understand how and when to take each. * ibuprofen 800 MG tablet  Commonly known as: ADVIL;MOTRIN  Take 1 tablet by mouth every 8 hours as needed for Pain  What changed: Another medication with the same name was added. Make sure you understand how and when to take each.      *

## 2023-08-02 NOTE — ED NOTES
I have reviewed the discharge instructions with the patient. The patient verbalized understanding of instructions with no further questions.       Candace Mtz, DAHIANA  08/02/23 0937

## 2023-08-02 NOTE — DISCHARGE INSTRUCTIONS
Elevate legs at home. Try compression stockings to help with blood flow/swelling. Take medication as prescribed. Follow-up with your primary care physician within 2 days for reassessment. Bring the results from this visit with you for their review. Return to the ED immediately for any new, worsening, or persistent symptoms, including fever, vomiting, or any other medical concerns.

## 2023-08-02 NOTE — ED TRIAGE NOTES
Patient states bilateral swelling, onset a few weeks, states worsening since unable to take lasix due to medication pharmacy mix up.  Noted swelling, steady gait

## 2023-08-04 LAB
EKG ATRIAL RATE: 80 BPM
EKG DIAGNOSIS: NORMAL
EKG P AXIS: 59 DEGREES
EKG P-R INTERVAL: 138 MS
EKG Q-T INTERVAL: 390 MS
EKG QRS DURATION: 74 MS
EKG QTC CALCULATION (BAZETT): 449 MS
EKG R AXIS: 60 DEGREES
EKG T AXIS: 74 DEGREES
EKG VENTRICULAR RATE: 80 BPM

## 2023-08-04 PROCEDURE — 93010 ELECTROCARDIOGRAM REPORT: CPT | Performed by: INTERNAL MEDICINE

## (undated) DEVICE — 3M™ BAIR PAWS FLEX™ WARMING GOWN, STANDARD, 20 PER CASE 81003: Brand: BAIR PAWS™

## (undated) DEVICE — OCCLUSIVE GAUZE STRIP,3% BISMUTH TRIBROMOPHENATE IN PETROLATUM BLEND: Brand: XEROFORM

## (undated) DEVICE — GAUZE SPONGES,16 PLY: Brand: CURITY

## (undated) DEVICE — KENDALL SCD EXPRESS SLEEVES, KNEE LENGTH, MEDIUM: Brand: KENDALL SCD

## (undated) DEVICE — KNEE ARTHROSCOPY III-LF: Brand: MEDLINE INDUSTRIES, INC.

## (undated) DEVICE — (D)GLOVE SURG TRIFLX 7.5 PWD -- DISC BY MFR USE ITEM 102005

## (undated) DEVICE — BLADE SHV 4.0MM TOMCAT --

## (undated) DEVICE — ARTHROSCOPIC KNEE HOLDER: Brand: DEVON

## (undated) DEVICE — KERLIX BANDAGE ROLL: Brand: KERLIX

## (undated) DEVICE — FLEX ADVANTAGE 3000CC: Brand: FLEX ADVANTAGE

## (undated) DEVICE — ABDOMINAL PAD: Brand: DERMACEA

## (undated) DEVICE — KIT CLN UP BON SECOURS MARYV

## (undated) DEVICE — PREP SKN CHLRAPRP APPL 10.5ML --

## (undated) DEVICE — SOLUTION IRRIG 3000ML 0.9% SOD CHL FLX CONT 0797208] ICU MEDICAL INC]

## (undated) DEVICE — [ARTHROSCOPY PUMP,  DO NOT USE IF PACKAGE IS DAMAGED,  KEEP DRY,  KEEP AWAY FROM SUNLIGHT,  PROTECT FROM HEAT AND RADIOACTIVE SOURCES.]: Brand: FLOSTEADY

## (undated) DEVICE — CUFF TRNQT AD W4IN 34IN CIRC SURG GEL SGL PRT BLDR PNEUMAT

## (undated) DEVICE — SUTURE ETHLN SZ 4-0 L18IN NONABSORBABLE BLK L19MM PC-5 3/8 1894G